# Patient Record
Sex: FEMALE | Race: WHITE | NOT HISPANIC OR LATINO
[De-identification: names, ages, dates, MRNs, and addresses within clinical notes are randomized per-mention and may not be internally consistent; named-entity substitution may affect disease eponyms.]

---

## 2019-11-07 ENCOUNTER — TRANSCRIPTION ENCOUNTER (OUTPATIENT)
Age: 79
End: 2019-11-07

## 2022-11-11 ENCOUNTER — RX ONLY (RX ONLY)
Age: 82
End: 2022-11-11

## 2022-11-11 ENCOUNTER — OFFICE (OUTPATIENT)
Dept: URBAN - METROPOLITAN AREA CLINIC 38 | Facility: CLINIC | Age: 82
Setting detail: OPHTHALMOLOGY
End: 2022-11-11
Payer: MEDICARE

## 2022-11-11 DIAGNOSIS — H04.563: ICD-10-CM

## 2022-11-11 DIAGNOSIS — H02.132: ICD-10-CM

## 2022-11-11 DIAGNOSIS — H02.135: ICD-10-CM

## 2022-11-11 PROBLEM — H16.223 DRY EYE SYNDROME K SICCA; BOTH EYES: Status: ACTIVE | Noted: 2022-11-11

## 2022-11-11 PROBLEM — H04.223 EPIPHORA- DUE TO INSUFFICIENT DRAINAGE; BOTH EYES: Status: ACTIVE | Noted: 2022-11-11

## 2022-11-11 PROCEDURE — 99204 OFFICE O/P NEW MOD 45 MIN: CPT | Performed by: OPHTHALMOLOGY

## 2022-11-11 PROCEDURE — 68840 EXPLORE/IRRIGATE TEAR DUCTS: CPT | Performed by: OPHTHALMOLOGY

## 2022-11-11 PROCEDURE — 92285 EXTERNAL OCULAR PHOTOGRAPHY: CPT | Performed by: OPHTHALMOLOGY

## 2022-11-11 ASSESSMENT — AXIALLENGTH_DERIVED
OS_AL: 23.4289
OD_AL: 23.4687

## 2022-11-11 ASSESSMENT — REFRACTION_AUTOREFRACTION
OD_AXIS: 055
OS_AXIS: 097
OS_SPHERE: -1.00
OD_CYLINDER: +0.25
OD_SPHERE: -0.75
OS_CYLINDER: +0.25

## 2022-11-11 ASSESSMENT — LID POSITION - ECTROPION
OS_ECTROPION: LLL T
OD_ECTROPION: RLL

## 2022-11-11 ASSESSMENT — CONFRONTATIONAL VISUAL FIELD TEST (CVF)
OD_FINDINGS: FULL
OS_FINDINGS: FULL

## 2022-11-11 ASSESSMENT — SUPERFICIAL PUNCTATE KERATITIS (SPK)
OD_SPK: 1+
OS_SPK: 1+

## 2022-11-11 ASSESSMENT — SPHEQUIV_DERIVED
OS_SPHEQUIV: -0.875
OD_SPHEQUIV: -0.625

## 2022-11-11 ASSESSMENT — KERATOMETRY
OD_AXISANGLE_DEGREES: 131
OD_K2POWER_DIOPTERS: 44.50
OS_K2POWER_DIOPTERS: 45.75
OS_K1POWER_DIOPTERS: 44.00
OS_AXISANGLE_DEGREES: 092
METHOD_AUTO_MANUAL: AUTO
OD_K1POWER_DIOPTERS: 44.50

## 2022-11-11 ASSESSMENT — VISUAL ACUITY
OS_BCVA: 20/25-
OD_BCVA: 20/25

## 2022-11-11 ASSESSMENT — PUNCTA - ASSESSMENT: OS_PUNCTA: SMALL

## 2022-11-12 PROBLEM — H04.563 PUNCTAL STENOSIS LACRIMAL PUNCTUM; BOTH EYES: Status: ACTIVE | Noted: 2022-11-11

## 2023-01-20 ENCOUNTER — OFFICE (OUTPATIENT)
Dept: URBAN - METROPOLITAN AREA CLINIC 38 | Facility: CLINIC | Age: 83
Setting detail: OPHTHALMOLOGY
End: 2023-01-20
Payer: MEDICARE

## 2023-01-20 DIAGNOSIS — H02.132: ICD-10-CM

## 2023-01-20 DIAGNOSIS — H02.135: ICD-10-CM

## 2023-01-20 DIAGNOSIS — H04.223: ICD-10-CM

## 2023-01-20 DIAGNOSIS — H16.223: ICD-10-CM

## 2023-01-20 DIAGNOSIS — H04.563: ICD-10-CM

## 2023-01-20 PROCEDURE — 99213 OFFICE O/P EST LOW 20 MIN: CPT | Performed by: OPHTHALMOLOGY

## 2023-01-20 ASSESSMENT — CONFRONTATIONAL VISUAL FIELD TEST (CVF)
OS_FINDINGS: FULL
OD_FINDINGS: FULL

## 2023-01-20 ASSESSMENT — LID POSITION - ECTROPION
OD_ECTROPION: RLL
OS_ECTROPION: LLL T

## 2023-01-20 ASSESSMENT — PUNCTA - ASSESSMENT: OS_PUNCTA: SMALL

## 2023-01-20 ASSESSMENT — SUPERFICIAL PUNCTATE KERATITIS (SPK)
OD_SPK: 1+
OS_SPK: 1+

## 2023-01-21 PROBLEM — H01.111 ALLERGIC DERMATITIS ; RIGHT UPPER LID, RIGHT LOWER LID, LEFT UPPER LID, LEFT LOWER LID: Status: ACTIVE | Noted: 2023-01-20

## 2023-01-21 PROBLEM — H01.115 ALLERGIC DERMATITIS ; RIGHT UPPER LID, RIGHT LOWER LID, LEFT UPPER LID, LEFT LOWER LID: Status: ACTIVE | Noted: 2023-01-20

## 2023-01-21 PROBLEM — H01.114 ALLERGIC DERMATITIS ; RIGHT UPPER LID, RIGHT LOWER LID, LEFT UPPER LID, LEFT LOWER LID: Status: ACTIVE | Noted: 2023-01-20

## 2023-01-21 PROBLEM — H01.112 ALLERGIC DERMATITIS ; RIGHT UPPER LID, RIGHT LOWER LID, LEFT UPPER LID, LEFT LOWER LID: Status: ACTIVE | Noted: 2023-01-20

## 2023-01-22 ASSESSMENT — KERATOMETRY
OD_AXISANGLE_DEGREES: 131
OS_AXISANGLE_DEGREES: 092
METHOD_AUTO_MANUAL: AUTO
OD_K2POWER_DIOPTERS: 44.50
OD_K1POWER_DIOPTERS: 44.50
OS_K1POWER_DIOPTERS: 44.00
OS_K2POWER_DIOPTERS: 45.75

## 2023-01-22 ASSESSMENT — SPHEQUIV_DERIVED
OS_SPHEQUIV: -0.875
OD_SPHEQUIV: -0.625

## 2023-01-22 ASSESSMENT — REFRACTION_AUTOREFRACTION
OS_AXIS: 097
OD_SPHERE: -0.75
OS_SPHERE: -1.00
OD_AXIS: 055
OS_CYLINDER: +0.25
OD_CYLINDER: +0.25

## 2023-01-22 ASSESSMENT — AXIALLENGTH_DERIVED
OD_AL: 23.4687
OS_AL: 23.4289

## 2023-01-22 ASSESSMENT — VISUAL ACUITY
OD_BCVA: 20/25
OS_BCVA: 20/25-

## 2023-02-03 ENCOUNTER — AMBULATORY SURGERY CENTER (OUTPATIENT)
Dept: URBAN - METROPOLITAN AREA SURGERY 4 | Facility: SURGERY | Age: 83
Setting detail: OPHTHALMOLOGY
End: 2023-02-03
Payer: MEDICARE

## 2023-02-03 DIAGNOSIS — H02.135: ICD-10-CM

## 2023-02-03 DIAGNOSIS — H04.563: ICD-10-CM

## 2023-02-03 DIAGNOSIS — H02.132: ICD-10-CM

## 2023-02-03 DIAGNOSIS — H04.223: ICD-10-CM

## 2023-02-03 PROCEDURE — 67914 REPAIR EYELID DEFECT: CPT | Performed by: OPHTHALMOLOGY

## 2023-02-03 PROCEDURE — 68440 SNIP INC LACRIMAL PUNCTUM: CPT | Performed by: OPHTHALMOLOGY

## 2023-02-03 PROCEDURE — 67950 REVISION OF EYELID: CPT | Performed by: OPHTHALMOLOGY

## 2023-02-03 PROCEDURE — 14060 TIS TRNFR E/N/E/L 10 SQ CM/<: CPT | Performed by: OPHTHALMOLOGY

## 2023-02-10 ENCOUNTER — OFFICE (OUTPATIENT)
Dept: URBAN - METROPOLITAN AREA CLINIC 38 | Facility: CLINIC | Age: 83
Setting detail: OPHTHALMOLOGY
End: 2023-02-10
Payer: MEDICARE

## 2023-02-10 ENCOUNTER — RX ONLY (RX ONLY)
Age: 83
End: 2023-02-10

## 2023-02-10 DIAGNOSIS — H02.132: ICD-10-CM

## 2023-02-10 DIAGNOSIS — H04.563: ICD-10-CM

## 2023-02-10 DIAGNOSIS — H02.135: ICD-10-CM

## 2023-02-10 PROCEDURE — 99024 POSTOP FOLLOW-UP VISIT: CPT | Performed by: OPHTHALMOLOGY

## 2023-02-10 ASSESSMENT — SUPERFICIAL PUNCTATE KERATITIS (SPK)
OD_SPK: 1+
OS_SPK: 1+

## 2023-02-10 ASSESSMENT — REFRACTION_AUTOREFRACTION
OS_AXIS: 097
OD_SPHERE: -0.75
OD_CYLINDER: +0.25
OS_SPHERE: -1.00
OS_CYLINDER: +0.25
OD_AXIS: 055

## 2023-02-10 ASSESSMENT — KERATOMETRY
OS_K1POWER_DIOPTERS: 44.00
METHOD_AUTO_MANUAL: AUTO
OD_AXISANGLE_DEGREES: 131
OD_K2POWER_DIOPTERS: 44.50
OS_K2POWER_DIOPTERS: 45.75
OD_K1POWER_DIOPTERS: 44.50
OS_AXISANGLE_DEGREES: 092

## 2023-02-10 ASSESSMENT — CONFRONTATIONAL VISUAL FIELD TEST (CVF)
OD_FINDINGS: FULL
OS_FINDINGS: FULL

## 2023-02-10 ASSESSMENT — LID POSITION - ECTROPION
OD_ECTROPION: ABSENT
OS_ECTROPION: ABSENT

## 2023-02-10 ASSESSMENT — SPHEQUIV_DERIVED
OD_SPHEQUIV: -0.625
OS_SPHEQUIV: -0.875

## 2023-02-10 ASSESSMENT — VISUAL ACUITY
OS_BCVA: 20/50
OD_BCVA: 20/40

## 2023-02-10 ASSESSMENT — PUNCTA - ASSESSMENT
OD_PUNCTA: LARGE
OS_PUNCTA: LARGE

## 2023-02-10 ASSESSMENT — LID POSITION - COMMENTS
OD_COMMENTS: WOUND C/D/I HEALING WELLGOOD HEIGHT AND GOOD SYMMETRY GOOD LID TENSION
OS_COMMENTS: WOUND C/D/I HEALING WELLGOOD HEIGHT AND GOOD SYMMETRY GOOD LID TENSION

## 2023-02-10 ASSESSMENT — AXIALLENGTH_DERIVED
OS_AL: 23.4289
OD_AL: 23.4687

## 2023-04-14 ENCOUNTER — RX ONLY (RX ONLY)
Age: 83
End: 2023-04-14

## 2023-04-14 ENCOUNTER — OFFICE (OUTPATIENT)
Dept: URBAN - METROPOLITAN AREA CLINIC 38 | Facility: CLINIC | Age: 83
Setting detail: OPHTHALMOLOGY
End: 2023-04-14
Payer: COMMERCIAL

## 2023-04-14 DIAGNOSIS — H02.135: ICD-10-CM

## 2023-04-14 DIAGNOSIS — H16.223: ICD-10-CM

## 2023-04-14 DIAGNOSIS — H02.132: ICD-10-CM

## 2023-04-14 DIAGNOSIS — H02.825: ICD-10-CM

## 2023-04-14 DIAGNOSIS — H04.563: ICD-10-CM

## 2023-04-14 PROCEDURE — 99024 POSTOP FOLLOW-UP VISIT: CPT | Performed by: OPHTHALMOLOGY

## 2023-04-14 PROCEDURE — 11440 EXC FACE-MM B9+MARG 0.5 CM/<: CPT | Performed by: OPHTHALMOLOGY

## 2023-04-14 ASSESSMENT — AXIALLENGTH_DERIVED
OS_AL: 23.4289
OD_AL: 23.4687

## 2023-04-14 ASSESSMENT — LID POSITION - ECTROPION
OS_ECTROPION: ABSENT
OD_ECTROPION: ABSENT

## 2023-04-14 ASSESSMENT — CONFRONTATIONAL VISUAL FIELD TEST (CVF)
OS_FINDINGS: FULL
OD_FINDINGS: FULL

## 2023-04-14 ASSESSMENT — KERATOMETRY
OD_AXISANGLE_DEGREES: 131
OD_K2POWER_DIOPTERS: 44.50
OS_AXISANGLE_DEGREES: 092
OS_K2POWER_DIOPTERS: 45.75
METHOD_AUTO_MANUAL: AUTO
OS_K1POWER_DIOPTERS: 44.00
OD_K1POWER_DIOPTERS: 44.50

## 2023-04-14 ASSESSMENT — PUNCTA - ASSESSMENT
OD_PUNCTA: LARGE
OS_PUNCTA: LARGE

## 2023-04-14 ASSESSMENT — VISUAL ACUITY
OD_BCVA: 20/25-1
OS_BCVA: 20/25-1

## 2023-04-14 ASSESSMENT — REFRACTION_AUTOREFRACTION
OD_AXIS: 055
OS_AXIS: 097
OS_CYLINDER: +0.25
OD_SPHERE: -0.75
OD_CYLINDER: +0.25
OS_SPHERE: -1.00

## 2023-04-14 ASSESSMENT — SPHEQUIV_DERIVED
OD_SPHEQUIV: -0.625
OS_SPHEQUIV: -0.875

## 2023-04-14 ASSESSMENT — SUPERFICIAL PUNCTATE KERATITIS (SPK)
OD_SPK: 1+
OS_SPK: 1+

## 2023-04-14 ASSESSMENT — LID POSITION - COMMENTS: OD_COMMENTS: GOOD HEIGHT AND GOOD SYMMETRY GOOD LID TENSION

## 2023-05-27 ENCOUNTER — INPATIENT (INPATIENT)
Facility: HOSPITAL | Age: 83
LOS: 2 days | Discharge: HOME | End: 2023-05-30
Attending: STUDENT IN AN ORGANIZED HEALTH CARE EDUCATION/TRAINING PROGRAM | Admitting: STUDENT IN AN ORGANIZED HEALTH CARE EDUCATION/TRAINING PROGRAM
Payer: MEDICARE

## 2023-05-27 VITALS
TEMPERATURE: 98 F | OXYGEN SATURATION: 97 % | DIASTOLIC BLOOD PRESSURE: 65 MMHG | HEART RATE: 82 BPM | SYSTOLIC BLOOD PRESSURE: 168 MMHG | RESPIRATION RATE: 16 BRPM

## 2023-05-27 DIAGNOSIS — K21.9 GASTRO-ESOPHAGEAL REFLUX DISEASE WITHOUT ESOPHAGITIS: ICD-10-CM

## 2023-05-27 DIAGNOSIS — K80.20 CALCULUS OF GALLBLADDER WITHOUT CHOLECYSTITIS WITHOUT OBSTRUCTION: ICD-10-CM

## 2023-05-27 DIAGNOSIS — Z98.890 OTHER SPECIFIED POSTPROCEDURAL STATES: Chronic | ICD-10-CM

## 2023-05-27 DIAGNOSIS — F41.9 ANXIETY DISORDER, UNSPECIFIED: ICD-10-CM

## 2023-05-27 DIAGNOSIS — Z96.652 PRESENCE OF LEFT ARTIFICIAL KNEE JOINT: Chronic | ICD-10-CM

## 2023-05-27 DIAGNOSIS — Z90.49 ACQUIRED ABSENCE OF OTHER SPECIFIED PARTS OF DIGESTIVE TRACT: Chronic | ICD-10-CM

## 2023-05-27 DIAGNOSIS — I10 ESSENTIAL (PRIMARY) HYPERTENSION: ICD-10-CM

## 2023-05-27 DIAGNOSIS — Z29.9 ENCOUNTER FOR PROPHYLACTIC MEASURES, UNSPECIFIED: ICD-10-CM

## 2023-05-27 DIAGNOSIS — E78.5 HYPERLIPIDEMIA, UNSPECIFIED: ICD-10-CM

## 2023-05-27 DIAGNOSIS — K80.50 CALCULUS OF BILE DUCT WITHOUT CHOLANGITIS OR CHOLECYSTITIS WITHOUT OBSTRUCTION: ICD-10-CM

## 2023-05-27 DIAGNOSIS — F10.90 ALCOHOL USE, UNSPECIFIED, UNCOMPLICATED: ICD-10-CM

## 2023-05-27 DIAGNOSIS — Z96.651 PRESENCE OF RIGHT ARTIFICIAL KNEE JOINT: Chronic | ICD-10-CM

## 2023-05-27 LAB
A1C WITH ESTIMATED AVERAGE GLUCOSE RESULT: 5.8 % — HIGH (ref 4–5.6)
ALBUMIN SERPL ELPH-MCNC: 3.6 G/DL — SIGNIFICANT CHANGE UP (ref 3.3–5)
ALP SERPL-CCNC: 427 U/L — HIGH (ref 40–120)
ALT FLD-CCNC: 185 U/L — HIGH (ref 4–33)
ANION GAP SERPL CALC-SCNC: 13 MMOL/L — SIGNIFICANT CHANGE UP (ref 7–14)
APTT BLD: 29.8 SEC — SIGNIFICANT CHANGE UP (ref 27–36.3)
AST SERPL-CCNC: 137 U/L — HIGH (ref 4–32)
BASE EXCESS BLDV CALC-SCNC: 3.1 MMOL/L — HIGH (ref -2–3)
BASOPHILS # BLD AUTO: 0 K/UL — SIGNIFICANT CHANGE UP (ref 0–0.2)
BASOPHILS NFR BLD AUTO: 0 % — SIGNIFICANT CHANGE UP (ref 0–2)
BILIRUB SERPL-MCNC: 5.2 MG/DL — HIGH (ref 0.2–1.2)
BLD GP AB SCN SERPL QL: NEGATIVE — SIGNIFICANT CHANGE UP
BLOOD GAS VENOUS COMPREHENSIVE RESULT: SIGNIFICANT CHANGE UP
BUN SERPL-MCNC: 17 MG/DL — SIGNIFICANT CHANGE UP (ref 7–23)
CALCIUM SERPL-MCNC: 8.1 MG/DL — LOW (ref 8.4–10.5)
CHLORIDE BLDV-SCNC: 98 MMOL/L — SIGNIFICANT CHANGE UP (ref 96–108)
CHLORIDE SERPL-SCNC: 95 MMOL/L — LOW (ref 98–107)
CHOLEST SERPL-MCNC: 120 MG/DL — SIGNIFICANT CHANGE UP
CO2 BLDV-SCNC: 29.9 MMOL/L — HIGH (ref 22–26)
CO2 SERPL-SCNC: 25 MMOL/L — SIGNIFICANT CHANGE UP (ref 22–31)
CREAT SERPL-MCNC: 0.68 MG/DL — SIGNIFICANT CHANGE UP (ref 0.5–1.3)
EGFR: 86 ML/MIN/1.73M2 — SIGNIFICANT CHANGE UP
EOSINOPHIL # BLD AUTO: 0 K/UL — SIGNIFICANT CHANGE UP (ref 0–0.5)
EOSINOPHIL NFR BLD AUTO: 0 % — SIGNIFICANT CHANGE UP (ref 0–6)
ESTIMATED AVERAGE GLUCOSE: 120 — SIGNIFICANT CHANGE UP
GAS PNL BLDV: 131 MMOL/L — LOW (ref 136–145)
GLUCOSE BLDV-MCNC: 115 MG/DL — HIGH (ref 70–99)
GLUCOSE SERPL-MCNC: 113 MG/DL — HIGH (ref 70–99)
HCO3 BLDV-SCNC: 28 MMOL/L — SIGNIFICANT CHANGE UP (ref 22–29)
HCT VFR BLD CALC: 34.2 % — LOW (ref 34.5–45)
HCT VFR BLDA CALC: 34 % — LOW (ref 34.5–46.5)
HDLC SERPL-MCNC: 34 MG/DL — LOW
HGB BLD CALC-MCNC: 11.3 G/DL — LOW (ref 11.7–16.1)
HGB BLD-MCNC: 11.1 G/DL — LOW (ref 11.5–15.5)
IANC: 17.16 K/UL — HIGH (ref 1.8–7.4)
INR BLD: 1.3 RATIO — HIGH (ref 0.88–1.16)
LACTATE BLDV-MCNC: 0.9 MMOL/L — SIGNIFICANT CHANGE UP (ref 0.5–2)
LIDOCAIN IGE QN: 25 U/L — SIGNIFICANT CHANGE UP (ref 7–60)
LIPID PNL WITH DIRECT LDL SERPL: 67 MG/DL — SIGNIFICANT CHANGE UP
LYMPHOCYTES # BLD AUTO: 0.97 K/UL — LOW (ref 1–3.3)
LYMPHOCYTES # BLD AUTO: 5.2 % — LOW (ref 13–44)
MAGNESIUM SERPL-MCNC: 2.1 MG/DL — SIGNIFICANT CHANGE UP (ref 1.6–2.6)
MANUAL SMEAR VERIFICATION: SIGNIFICANT CHANGE UP
MCHC RBC-ENTMCNC: 29.4 PG — SIGNIFICANT CHANGE UP (ref 27–34)
MCHC RBC-ENTMCNC: 32.5 GM/DL — SIGNIFICANT CHANGE UP (ref 32–36)
MCV RBC AUTO: 90.7 FL — SIGNIFICANT CHANGE UP (ref 80–100)
MONOCYTES # BLD AUTO: 0.65 K/UL — SIGNIFICANT CHANGE UP (ref 0–0.9)
MONOCYTES NFR BLD AUTO: 3.5 % — SIGNIFICANT CHANGE UP (ref 2–14)
NEUTROPHILS # BLD AUTO: 16.99 K/UL — HIGH (ref 1.8–7.4)
NEUTROPHILS NFR BLD AUTO: 82.6 % — HIGH (ref 43–77)
NEUTS BAND # BLD: 8.7 % — HIGH (ref 0–6)
NON HDL CHOLESTEROL: 86 MG/DL — SIGNIFICANT CHANGE UP
NT-PROBNP SERPL-SCNC: 2915 PG/ML — HIGH
PCO2 BLDV: 46 MMHG — SIGNIFICANT CHANGE UP (ref 39–52)
PH BLDV: 7.4 — SIGNIFICANT CHANGE UP (ref 7.32–7.43)
PHOSPHATE SERPL-MCNC: 3.4 MG/DL — SIGNIFICANT CHANGE UP (ref 2.5–4.5)
PLAT MORPH BLD: NORMAL — SIGNIFICANT CHANGE UP
PLATELET # BLD AUTO: 171 K/UL — SIGNIFICANT CHANGE UP (ref 150–400)
PLATELET COUNT - ESTIMATE: NORMAL — SIGNIFICANT CHANGE UP
PO2 BLDV: 73 MMHG — HIGH (ref 25–45)
POTASSIUM BLDV-SCNC: 3.7 MMOL/L — SIGNIFICANT CHANGE UP (ref 3.5–5.1)
POTASSIUM SERPL-MCNC: 3.7 MMOL/L — SIGNIFICANT CHANGE UP (ref 3.5–5.3)
POTASSIUM SERPL-SCNC: 3.7 MMOL/L — SIGNIFICANT CHANGE UP (ref 3.5–5.3)
PROT SERPL-MCNC: 6.4 G/DL — SIGNIFICANT CHANGE UP (ref 6–8.3)
PROTHROM AB SERPL-ACNC: 15.1 SEC — HIGH (ref 10.5–13.4)
RBC # BLD: 3.77 M/UL — LOW (ref 3.8–5.2)
RBC # FLD: 14.3 % — SIGNIFICANT CHANGE UP (ref 10.3–14.5)
RBC BLD AUTO: NORMAL — SIGNIFICANT CHANGE UP
RH IG SCN BLD-IMP: POSITIVE — SIGNIFICANT CHANGE UP
SAO2 % BLDV: 96.1 % — HIGH (ref 67–88)
SODIUM SERPL-SCNC: 133 MMOL/L — LOW (ref 135–145)
TRIGL SERPL-MCNC: 97 MG/DL — SIGNIFICANT CHANGE UP
TROPONIN T, HIGH SENSITIVITY RESULT: 15 NG/L — SIGNIFICANT CHANGE UP
TSH SERPL-MCNC: 2.07 UIU/ML — SIGNIFICANT CHANGE UP (ref 0.27–4.2)
WBC # BLD: 18.61 K/UL — HIGH (ref 3.8–10.5)
WBC # FLD AUTO: 18.61 K/UL — HIGH (ref 3.8–10.5)

## 2023-05-27 PROCEDURE — 43274 ERCP DUCT STENT PLACEMENT: CPT | Mod: GC

## 2023-05-27 PROCEDURE — 71045 X-RAY EXAM CHEST 1 VIEW: CPT | Mod: 26

## 2023-05-27 PROCEDURE — 93010 ELECTROCARDIOGRAM REPORT: CPT

## 2023-05-27 PROCEDURE — 74330 X-RAY BILE/PANC ENDOSCOPY: CPT | Mod: 26,GC

## 2023-05-27 PROCEDURE — 99223 1ST HOSP IP/OBS HIGH 75: CPT | Mod: 25

## 2023-05-27 PROCEDURE — 71275 CT ANGIOGRAPHY CHEST: CPT | Mod: 26

## 2023-05-27 PROCEDURE — 99223 1ST HOSP IP/OBS HIGH 75: CPT | Mod: GC

## 2023-05-27 PROCEDURE — 99285 EMERGENCY DEPT VISIT HI MDM: CPT

## 2023-05-27 DEVICE — GWIRE JAGTOME REVOLUTION RX 260CM/0.025IN: Type: IMPLANTABLE DEVICE | Status: FUNCTIONAL

## 2023-05-27 DEVICE — STENT BIL ADVANIX 10FRX7CM PRELOADED: Type: IMPLANTABLE DEVICE | Status: FUNCTIONAL

## 2023-05-27 RX ORDER — ENOXAPARIN SODIUM 100 MG/ML
40 INJECTION SUBCUTANEOUS EVERY 24 HOURS
Refills: 0 | Status: DISCONTINUED | OUTPATIENT
Start: 2023-05-27 | End: 2023-05-30

## 2023-05-27 RX ORDER — OXYCODONE HYDROCHLORIDE 5 MG/1
5 TABLET ORAL ONCE
Refills: 0 | Status: DISCONTINUED | OUTPATIENT
Start: 2023-05-27 | End: 2023-05-28

## 2023-05-27 RX ORDER — PANTOPRAZOLE SODIUM 20 MG/1
40 TABLET, DELAYED RELEASE ORAL
Refills: 0 | Status: DISCONTINUED | OUTPATIENT
Start: 2023-05-27 | End: 2023-05-30

## 2023-05-27 RX ORDER — THIAMINE MONONITRATE (VIT B1) 100 MG
100 TABLET ORAL DAILY
Refills: 0 | Status: DISCONTINUED | OUTPATIENT
Start: 2023-05-27 | End: 2023-05-30

## 2023-05-27 RX ORDER — ATORVASTATIN CALCIUM 80 MG/1
40 TABLET, FILM COATED ORAL AT BEDTIME
Refills: 0 | Status: DISCONTINUED | OUTPATIENT
Start: 2023-05-27 | End: 2023-05-30

## 2023-05-27 RX ORDER — PIPERACILLIN AND TAZOBACTAM 4; .5 G/20ML; G/20ML
3.38 INJECTION, POWDER, LYOPHILIZED, FOR SOLUTION INTRAVENOUS EVERY 8 HOURS
Refills: 0 | Status: DISCONTINUED | OUTPATIENT
Start: 2023-05-27 | End: 2023-05-30

## 2023-05-27 RX ORDER — INDOMETHACIN 50 MG
100 CAPSULE ORAL ONCE
Refills: 0 | Status: COMPLETED | OUTPATIENT
Start: 2023-05-27 | End: 2023-05-27

## 2023-05-27 RX ORDER — FOLIC ACID 0.8 MG
1 TABLET ORAL DAILY
Refills: 0 | Status: DISCONTINUED | OUTPATIENT
Start: 2023-05-27 | End: 2023-05-30

## 2023-05-27 RX ORDER — HYDROMORPHONE HYDROCHLORIDE 2 MG/ML
0.5 INJECTION INTRAMUSCULAR; INTRAVENOUS; SUBCUTANEOUS
Refills: 0 | Status: DISCONTINUED | OUTPATIENT
Start: 2023-05-27 | End: 2023-05-28

## 2023-05-27 RX ORDER — ESCITALOPRAM OXALATE 10 MG/1
20 TABLET, FILM COATED ORAL DAILY
Refills: 0 | Status: DISCONTINUED | OUTPATIENT
Start: 2023-05-27 | End: 2023-05-30

## 2023-05-27 RX ORDER — SODIUM CHLORIDE 9 MG/ML
1000 INJECTION, SOLUTION INTRAVENOUS
Refills: 0 | Status: DISCONTINUED | OUTPATIENT
Start: 2023-05-27 | End: 2023-05-27

## 2023-05-27 RX ORDER — KETOROLAC TROMETHAMINE 30 MG/ML
15 SYRINGE (ML) INJECTION EVERY 6 HOURS
Refills: 0 | Status: DISCONTINUED | OUTPATIENT
Start: 2023-05-27 | End: 2023-05-27

## 2023-05-27 RX ORDER — SODIUM CHLORIDE 9 MG/ML
1000 INJECTION, SOLUTION INTRAVENOUS
Refills: 0 | Status: DISCONTINUED | OUTPATIENT
Start: 2023-05-27 | End: 2023-05-28

## 2023-05-27 RX ORDER — KETOROLAC TROMETHAMINE 30 MG/ML
15 SYRINGE (ML) INJECTION ONCE
Refills: 0 | Status: DISCONTINUED | OUTPATIENT
Start: 2023-05-27 | End: 2023-05-27

## 2023-05-27 RX ORDER — ACETAMINOPHEN 500 MG
650 TABLET ORAL EVERY 6 HOURS
Refills: 0 | Status: DISCONTINUED | OUTPATIENT
Start: 2023-05-27 | End: 2023-05-30

## 2023-05-27 RX ORDER — ONDANSETRON 8 MG/1
4 TABLET, FILM COATED ORAL ONCE
Refills: 0 | Status: DISCONTINUED | OUTPATIENT
Start: 2023-05-27 | End: 2023-05-28

## 2023-05-27 RX ORDER — PIPERACILLIN AND TAZOBACTAM 4; .5 G/20ML; G/20ML
3.38 INJECTION, POWDER, LYOPHILIZED, FOR SOLUTION INTRAVENOUS ONCE
Refills: 0 | Status: COMPLETED | OUTPATIENT
Start: 2023-05-27 | End: 2023-05-27

## 2023-05-27 RX ADMIN — Medication 15 MILLIGRAM(S): at 05:05

## 2023-05-27 RX ADMIN — ATORVASTATIN CALCIUM 40 MILLIGRAM(S): 80 TABLET, FILM COATED ORAL at 22:40

## 2023-05-27 RX ADMIN — PIPERACILLIN AND TAZOBACTAM 200 GRAM(S): 4; .5 INJECTION, POWDER, LYOPHILIZED, FOR SOLUTION INTRAVENOUS at 06:43

## 2023-05-27 RX ADMIN — Medication 100 MILLIGRAM(S): at 18:00

## 2023-05-27 RX ADMIN — PIPERACILLIN AND TAZOBACTAM 25 GRAM(S): 4; .5 INJECTION, POWDER, LYOPHILIZED, FOR SOLUTION INTRAVENOUS at 22:40

## 2023-05-27 RX ADMIN — PIPERACILLIN AND TAZOBACTAM 25 GRAM(S): 4; .5 INJECTION, POWDER, LYOPHILIZED, FOR SOLUTION INTRAVENOUS at 09:48

## 2023-05-27 RX ADMIN — SODIUM CHLORIDE 75 MILLILITER(S): 9 INJECTION, SOLUTION INTRAVENOUS at 18:58

## 2023-05-27 RX ADMIN — SODIUM CHLORIDE 100 MILLILITER(S): 9 INJECTION, SOLUTION INTRAVENOUS at 07:24

## 2023-05-27 RX ADMIN — Medication 100 MILLIGRAM(S): at 17:45

## 2023-05-27 RX ADMIN — Medication 15 MILLIGRAM(S): at 05:35

## 2023-05-27 NOTE — CONSULT NOTE ADULT - ASSESSMENT
Tok[place] criteria grade 1 (mild cholangitis)    Recommendations:    - continue abx   - supportive management   - CIWA for alcohol withdrawal   -   -  Impression:     #choledocholithiasis vs soft tissue mass   #hyperbilirubinemia  #elevated transaminases   -elevated Tbili likely related to CT findings. Elevated transaminases also indicative of cholestatic pattern   -Tok[place] criteria grade 1 (mild cholangitis)     #alcohol use disorder- no hx of withdrawal/seizures     Recommendations:    - continue abx   - supportive management   - CIWA for alcohol withdrawal   - trend CMP, CBC daily   - MRCP for better characterization of choledocholithiasis vs intraductal mass   - wean O2 as tolerated   - rest of care per primary team     All recommendations are tentative until note is attested by attending.     Cathy Aceves, PGY-4   Gastroenterology/Hepatology Fellow  Available on Microsoft Teams  [phone] ([facility] Short Range Pager)  [phone] ([facility] Long Range Pager)    After 5pm, please contact the on-call GI fellow. [phone] Impression:     #choledocholithiasis vs soft tissue mass   #hyperbilirubinemia  #elevated transaminases   -elevated Tbili likely related to CT findings. Elevated transaminases also indicative of cholestatic pattern   -Tok[place] criteria grade 2 (mild cholangitis)     #alcohol use disorder- no hx of withdrawal/seizures     Recommendations:    - continue abx   - supportive management   - CIWA for alcohol withdrawal   - trend CMP, CBC daily   - MRCP for better characterization of choledocholithiasis vs intraductal mass   - wean O2 as tolerated   - rest of care per primary team     All recommendations are tentative until note is attested by attending.     Cathy Aceves, PGY-4   Gastroenterology/Hepatology Fellow  Available on Microsoft Teams  [phone] ([facility] Short Range Pager)  [phone] ([facility] Long Range Pager)    After 5pm, please contact the on-call GI fellow. [phone] Impression:     #choledocholithiasis vs soft tissue mass per CT read   #hyperbilirubinemia  #elevated transaminases   -elevated Tbili likely related to CT findings. Elevated transaminases also indicative of cholestatic pattern   -[place] criteria grade 2 (cholangitis)     #alcohol use disorder- no hx of withdrawal/seizures     Recommendations:    - continue abx   - supportive management   - CIWA for alcohol withdrawal   - trend CMP, CBC daily   - wean O2 as tolerated   - rest of care per primary team     All recommendations are tentative until note is attested by attending.     Cathy Aceves, PGY-4   Gastroenterology/Hepatology Fellow  Available on Microsoft Teams  [phone] ([facility] Short Range Pager)  [phone] ([facility] Long Range Pager)    After 5pm, please contact the on-call GI fellow. [phone] Impression:     #choledocholithiasis vs soft tissue mass per CT read   #hyperbilirubinemia  #elevated transaminases   -elevated Tbili likely related to CT findings. Elevated transaminases also indicative of cholestatic pattern   -[place] criteria grade 2 (cholangitis)     #alcohol use disorder- no hx of withdrawal/seizures     Recommendations:    - continue abx   - supportive management   - keep NPO for tentative ERCP today   - CIWA for alcohol withdrawal   - trend CMP, CBC daily   - wean O2 as tolerated   - rest of care per primary team     All recommendations are tentative until note is attested by attending.     Cathy Aceves, PGY-4   Gastroenterology/Hepatology Fellow  Available on Microsoft Teams  [phone] ([facility] Short Range Pager)  [phone] ([facility] Long Range Pager)    After 5pm, please contact the on-call GI fellow. [phone]

## 2023-05-27 NOTE — ED PROVIDER NOTE - PHYSICAL EXAMINATION
General: non-toxic, NAD  HEENT: NCAT, PERRL, scleral icterus  Cardiac: RRR, no murmurs, 2+ peripheral pulses  Resp: CTAB  Abdomen: soft, non-distended, bowel sounds present, no ttp, no rebound or guarding. no organomegaly  Extremities: +peripheral edema, no calf tenderness, or leg size discrepancies  Skin: no rashes or jaundice  Neuro: AAOx4, 5+motor, sensation grossly intact  Psych: mood and affect appropriate

## 2023-05-27 NOTE — H&P ADULT - PROBLEM SELECTOR PLAN 2
Pt w/ hx of almost daily alcohol use (bottle of wine). Last drink on Thursday night (5/18).    -sx triggered CIWA  -c/w home thiamine and folic acid Pt w/ hx of almost daily alcohol use (bottle of wine). Last drink on Thursday night (5/18). Still within window of 24-72 hours. No prior hx of withdrawal (including seizures).    -sx triggered CIWA  -c/w home thiamine and folic acid  - fall & seizure precautions    - zofran 4mg IV Q8H PRN nausea and/or vomiting  - monitor electrolytes & replete PRN  -  consult

## 2023-05-27 NOTE — CONSULT NOTE ADULT - SUBJECTIVE AND OBJECTIVE BOX
HPI: 82y female with hx HTN, etoh use (one bottle of wine daily, no hx withdrawal seizures) presents from [facility] for concerns of choledocholithiasis.     Patient accompanied by grandson at bedside. Per grandson, patient is at baseline mental status, but appears a little disoriented at times given that they have not slept all night and have been transferred from another hospital.     Patient drinks a large bottle of alcohol a day. Has not had withdrawal symptoms previously/szs/DTs previously. Has been at detox 3-4 years ago.     Allergies:  No Known Allergies      Home Medications:    Hospital Medications:  lactated ringers. 1000 milliLiter(s) IV Continuous <Continuous>  piperacillin/tazobactam IVPB.. 3.375 Gram(s) IV Intermittent every 8 hours      PMHX/PSHX:      Family history:      Denies family history of colon cancer/polyps, stomach cancer/polyps, pancreatic cancer/masses, liver cancer/disease, ovarian cancer and endometrial cancer.    Social History:   Tob: Denies  EtOH: Denies  Illicit Drugs: Denies    ROS:     General:  No wt loss, fevers, chills, night sweats, fatigue  Eyes:  Good vision, no reported pain  ENT:  No sore throat, pain, runny nose, dysphagia  CV:  No pain, palpitations, hypo/hypertension  Pulm:  No dyspnea, cough, tachypnea, wheezing  GI:  see HPI  :  No pain, bleeding, incontinence, nocturia  Muscle:  No pain, weakness  Neuro:  No weakness, tingling, memory problems  Psych:  No fatigue, insomnia, mood problems, depression  Endocrine:  No polyuria, polydipsia, cold/heat intolerance  Heme:  No petechiae, ecchymosis, easy bruisability  Skin:  No rash, tattoos, scars, edema    PHYSICAL EXAM:     GENERAL:  No acute distress  HEENT:  NCAT, no scleral icterus   CHEST:  no respiratory distress  HEART:  Regular rate and rhythm  ABDOMEN:  Soft, non-tender, non-distended, normoactive bowel sounds,  no masses  EXTREMITIES: No edema  SKIN:  No rash/erythema/ecchymoses/petechiae/wounds/abscess/warm/dry  NEURO:  Alert and oriented x 3, no asterixis    Vital Signs:  Vital Signs Last 24 Hrs  T(C): 36.7 (27 May 2023 06:30), Max: 36.8 (27 May 2023 03:30)  T(F): 98 (27 May 2023 06:30), Max: 98.2 (27 May 2023 03:30)  HR: 77 (27 May 2023 06:30) (77 - 82)  BP: 104/57 (27 May 2023 06:30) (104/57 - 168/65)  BP(mean): --  RR: 16 (27 May 2023 06:30) (16 - 16)  SpO2: 97% (27 May 2023 06:30) (97% - 97%)    Parameters below as of 27 May 2023 06:30  Patient On (Oxygen Delivery Method): nasal cannula  O2 Flow (L/min): 2    Daily     Daily     LABS:                        11.1   18.61 )-----------( 171      ( 27 May 2023 05:05 )             34.2     Mean Cell Volume: 90.7 fL (05-27-23 @ 05:05)    05-27    133<L>  |  95<L>  |  17  ----------------------------<  113<H>  3.7   |  25  |  0.68    Ca    8.1<L>      27 May 2023 05:05    TPro  6.4  /  Alb  3.6  /  TBili  5.2<H>  /  DBili  x   /  AST  137<H>  /  ALT  185<H>  /  AlkPhos  427<H>  05-27    LIVER FUNCTIONS - ( 27 May 2023 05:05 )  Alb: 3.6 g/dL / Pro: 6.4 g/dL / ALK PHOS: 427 U/L / ALT: 185 U/L / AST: 137 U/L / GGT: x               Amylase Serum--      Lipase serum25       Ammonia--                          11.1   18.61 )-----------( 171      ( 27 May 2023 05:05 )             34.2       Imaging:             HPI: 82y female with hx HTN, etoh use (one bottle of wine daily, no hx withdrawal seizures) presents from [facility] for concerns of choledocholithiasis.     Patient accompanied by grandson at bedside. Per grandson, patient is at baseline mental status, but appears a little disoriented at times given that they have not slept all night and have been transferred from another hospital. Patient had abdominal pain 1 week prior and had gone to the hospital for evaluation. At [facility], her transaminases had been improving so she was discharged with followup with GI for ?EUS. Patient reports that she was doing well but had recurrent abd pain in Thursday with emesis (nonbloody). Per grandson, patient had been jaundiced one day prior to admission. Abdominal pain more frequently after meals.     CT with CBD dilated to 1.5cm and abrupt narrowing of CBD distally. Soft tissue density. No pancreatic duct dilation. US with no CBD dilation.     Patient reports mild abd pain with palpation only (bilateral upper quadrants). Has not had an endoscopy previously. Patient denies fevers, chills, dysuria.     Patient drinks a large bottle of alcohol a day. Has not had withdrawal symptoms previously/szs/DTs previously. Has been at detox 3-4 years ago.     Allergies:  No Known Allergies      Home Medications:    Hospital Medications:  lactated ringers. 1000 milliLiter(s) IV Continuous <Continuous>  piperacillin/tazobactam IVPB.. 3.375 Gram(s) IV Intermittent every 8 hours      PMHX/PSHX:      Family history:      Denies family history of colon cancer/polyps, stomach cancer/polyps, pancreatic cancer/masses, liver cancer/disease, ovarian cancer and endometrial cancer.    Social History:   Tob: Denies  EtOH: Denies  Illicit Drugs: Denies    ROS:     General:  No wt loss, fevers, chills, night sweats, fatigue  Eyes:  Good vision, no reported pain  ENT:  No sore throat, pain, runny nose, dysphagia  CV:  No pain, palpitations, hypo/hypertension  Pulm:  No dyspnea, cough, tachypnea, wheezing  GI:  see HPI  :  No pain, bleeding, incontinence, nocturia  Muscle:  No pain, weakness  Neuro:  No weakness, tingling, memory problems  Psych:  No fatigue, insomnia, mood problems, depression  Endocrine:  No polyuria, polydipsia, cold/heat intolerance  Heme:  No petechiae, ecchymosis, easy bruisability  Skin:  No rash, tattoos, scars, edema    PHYSICAL EXAM:     GENERAL:  No acute distress, patient is very pleasant   HEENT:  NCAT, +scleral icterus   CHEST:  no respiratory distress, although patient on 2L (not home O2)   HEART:  Regular rate and rhythm  ABDOMEN:  Soft, non-tender, non-distended, no masses  EXTREMITIES: No edema  SKIN:  No rash/erythema/ecchymoses/petechiae/wounds/abscess/warm/dry  NEURO:  Alert and oriented x 3    Vital Signs:  Vital Signs Last 24 Hrs  T(C): 36.7 (27 May 2023 06:30), Max: 36.8 (27 May 2023 03:30)  T(F): 98 (27 May 2023 06:30), Max: 98.2 (27 May 2023 03:30)  HR: 77 (27 May 2023 06:30) (77 - 82)  BP: 104/57 (27 May 2023 06:30) (104/57 - 168/65)  BP(mean): --  RR: 16 (27 May 2023 06:30) (16 - 16)  SpO2: 97% (27 May 2023 06:30) (97% - 97%)    Parameters below as of 27 May 2023 06:30  Patient On (Oxygen Delivery Method): nasal cannula  O2 Flow (L/min): 2    Daily     Daily     LABS:                        11.1   18.61 )-----------( 171      ( 27 May 2023 05:05 )             34.2     Mean Cell Volume: 90.7 fL (05-27-23 @ 05:05)    05-27    133<L>  |  95<L>  |  17  ----------------------------<  113<H>  3.7   |  25  |  0.68    Ca    8.1<L>      27 May 2023 05:05    TPro  6.4  /  Alb  3.6  /  TBili  5.2<H>  /  DBili  x   /  AST  137<H>  /  ALT  185<H>  /  AlkPhos  427<H>  05-27    LIVER FUNCTIONS - ( 27 May 2023 05:05 )  Alb: 3.6 g/dL / Pro: 6.4 g/dL / ALK PHOS: 427 U/L / ALT: 185 U/L / AST: 137 U/L / GGT: x               Amylase Serum--      Lipase serum25       Ammonia--                          11.1   18.61 )-----------( 171      ( 27 May 2023 05:05 )             34.2       Imaging:

## 2023-05-27 NOTE — ED PROVIDER NOTE - ATTENDING CONTRIBUTION TO CARE
DR. SOMMER, ATTENDING MD-  I performed a face to face bedside interview with the patient regarding history of present illness, review of symptoms and past medical history. I completed an independent physical exam.  I have discussed the patient's plan of care with the resident.   Documentation as above in the note.    83 y/o female xfer from osh for gi eval of cholangitis.  Obtain rpt labs, admit for further gi care and evaluation.

## 2023-05-27 NOTE — H&P ADULT - NSHPREVIEWOFSYSTEMS_GEN_ALL_CORE
REVIEW OF SYSTEMS:    CONSTITUTIONAL: No weakness, fevers or chills  EYES/ENT: No visual changes;  No vertigo or throat pain   NECK: No pain or stiffness  RESPIRATORY: No cough, wheezing, hemoptysis; No shortness of breath  CARDIOVASCULAR: No chest pain or palpitations  GASTROINTESTINAL: +abdominal pain. No epigastric pain. No nausea, vomiting, or hematemesis; No diarrhea or constipation. No melena or hematochezia.  GENITOURINARY: No dysuria, frequency or hematuria  NEUROLOGICAL: No numbness or weakness  SKIN: No itching, rashes

## 2023-05-27 NOTE — H&P ADULT - ASSESSMENT
82y female with PMHx of HTN, etoh use (one bottle of wine daily, no hx withdrawal seizures) presenting from [facility] for concerns of choledocholithiasis, found to have elevated transaminases, hyperbilirubinemia and choledocholithiasis vs soft tissue mass per CT read.

## 2023-05-27 NOTE — ED ADULT NURSE NOTE - OBJECTIVE STATEMENT
A&OX4. PMH: Hx HTN and HLD.  Patient arrived from [facility] for GI consult.  Patient seen at at [facility] for c/o Abd pain with N/V for 1 week.  Diagnosed with cholangitis.  20g LAC by [facility].  Zosyn at 20:30, Magnesium 2 grams at 21:30 for low Magnesium level = 1.5.  Morphine 4mg at 21:11.  Zofran 4mg, NS, Tylenol 650mg. Respirations even and unlabored on 2L NC. Normal sinus on monitor. Labs obtained, awaiting results. Medications given as per current care plan. Safety precautions in place, bed in  lowest position and oriented to call bell.

## 2023-05-27 NOTE — H&P ADULT - PROBLEM SELECTOR PLAN 1
Pt presenting w/ hyperbilirubinemia, elevated transaminases. CT findings w/ soft tissue mass.    - appreciate GI recs  - continue zosyn  - keep NPO for tentative ERCP today   - trend CMP, CBC daily   - wean O2 as tolerated Pt presenting w/ hyperbilirubinemia, elevated transaminases. CT findings w/ soft tissue mass.    - appreciate GI recs  - continue zosyn  - keep NPO for tentative ERCP today   - if ERCP does not happen today, will keep on CLD  - trend CMP, CBC daily   - wean O2 as tolerated Pt presenting w/ hyperbilirubinemia, elevated transaminases. CT findings w/ soft tissue mass.    - appreciate GI recs  - continue zosyn  - blood cx obtained  - keep NPO for tentative ERCP today   - if ERCP does not happen today, will keep on CLD  - trend CMP, CBC daily   - wean O2 as tolerated Pt presenting w/ hyperbilirubinemia, elevated transaminases. CT with CBD dilated to 1.5cm and abrupt narrowing of CBD distally. Soft tissue density. No pancreatic duct dilation. US with no CBD dilation.     - appreciate GI recs  - continue zosyn  - blood cx obtained  - keep NPO for tentative ERCP today   - if ERCP does not happen today, will keep on CLD  - trend CMP, CBC daily   - wean O2 as tolerated

## 2023-05-27 NOTE — CONSULT NOTE ADULT - ATTENDING COMMENTS
83 yo F pmh obesity, active etoh use (1 bottle wine per day) presenting with 1 week of abd pain and new jaundice.  Went to OSH and found to have elevated LAEs a few days ago, but d/c home with outpatient follow up.  As SI developed and pain persisted, came back to ED at OSH.  Found to have elevated bili 5+, alk p elevated, and ast/alt improving but still in 300-400s. U/s with dilated duct and cholelithiasis. CT showing intraductal soft tissue lesion of unknown etiology.  WBC to 18s but no fever.  Tok[place] criteria 2 for cholangitis (moderate) like from obstructive source - ddx including stone vs mass (cholangio, ampullary).  On abx, tender on abd exam but no peritonitis or rigidity.  Ultimately will likely need EUS/ERCP in next 24-48 hours - will coordinate with hepatobiliary team (Radha ADAMS on call this weekend).  Keep NPO, keep abx at this time.  Reviewed with patient and grandson who are amenable.  Lastly would monitor for signs of withdrawal given etoh history.  While etoh hep/liver pathology were considered - much less likely cause of elevated LAEs given radiology and lae pattern

## 2023-05-27 NOTE — H&P ADULT - NSICDXPASTSURGICALHX_GEN_ALL_CORE_FT
PAST SURGICAL HISTORY:  History of right knee joint replacement     Knee joint replacement status, left     S/P appendectomy     S/P bunionectomy

## 2023-05-27 NOTE — ED ADULT NURSE NOTE - CHIEF COMPLAINT QUOTE
Pt arrives from [facility] for GI consult.  Pt seen at c/o Abd pain with N/V for 1 week.  Dx with cholangitis.  Hx HTN, HLD.  Pt had recent admission at [facility] and d/c home.  20g LAC by [facility].  Zosyn at 20:30, Magnesium 2 grams at 21:30 for low Magnesium level = 1.5.  Morphine 4mg at 21:11.  Zofran 4mg, NS, Tylenol 650mg.

## 2023-05-27 NOTE — ED PROVIDER NOTE - OBJECTIVE STATEMENT
82y female with hx HTN, etoh use (one bottle of wine daily, no hx withdrawal seizures) presents from [facility] for concerns of cholangitis. was admitted for 1w with elevated LFTs, which downtrended during admission and pt was discharged with plan for follow up EUS. thursday evening had an event of severe abd pain and vomiting nbnb so went to PCP friday AM who sent her to ED. at OSH CT and US showed cholelithiasis and CBD 7mm (appropriate for age) with elevated LFTs and alk phos to 200s/150s. lipase wnl. afebrile with hyperbili to 5 and scleral icterus. hypoxic to 88 in triage there with no hx tobacco smoke or lung disease, however states she has been dyspneic on exertion for some time. transferred for ERCP

## 2023-05-27 NOTE — ASU PREOP CHECKLIST - 1.
WALLACE Drinks 1 bottle of wine daily WALLACE Drinks 1 bottle of wine daily. No tremors, agitation or seizures noted

## 2023-05-27 NOTE — ED PROVIDER NOTE - PROGRESS NOTE DETAILS
GUADALUPE Mackey PGY2 discussed with GI. will see pt. GUADALUPE Mackey PGY2 discussed with GI. will see pt. labs somewhat improved from [facility]. hospitalist radha. GUADALUPE Mackey PGY2 discussed with hospitalist. accepts pt, requesting CTA for investigation of new hypoxia as well as pre-ops. will order trop/bnp for staging if PE+. pt clinically stable.

## 2023-05-27 NOTE — ED PROVIDER NOTE - CLINICAL SUMMARY MEDICAL DECISION MAKING FREE TEXT BOX
elderly female fatigued appearing presents as a transfer for gI in the setting of cholangitis. also with hx concerning for heart failure with hypoxia in triage at [place] and on interview here. pt on 0.5L NC. XR and repeat labs. cardiac monitor. VS stable. tba for cardiac workup/ercp.

## 2023-05-27 NOTE — H&P ADULT - NSHPPHYSICALEXAM_GEN_ALL_CORE
T(C): 36.8 (05-27-23 @ 09:08), Max: 36.8 (05-27-23 @ 03:30)  HR: 83 (05-27-23 @ 09:08) (77 - 83)  BP: 120/61 (05-27-23 @ 09:08) (104/57 - 168/65)  RR: 19 (05-27-23 @ 09:08) (16 - 19)  SpO2: 97% (05-27-23 @ 09:08) (97% - 97%)    PHYSICAL EXAM:  GENERAL: NAD, well-groomed, well-developed  HEAD:  Atraumatic, Normocephalic  EYES: EOMI, PERRLA, conjunctiva and sclera clear, no jaundice   ENMT: No tonsillar erythema, exudates, or enlargement; Moist mucous membranes  NECK: Supple, non tender, No JVD, Normal thyroid  HEART: Regular rate and rhythm; No murmurs, rubs, or gallops. S1/S2 present  RESPIRATORY: CTA B/L, No W/R/R  ABDOMEN: Soft, Nontender, Nondistended; Bowel sounds present. No hepatosplenomegally  NEUROLOGY: A&Ox3, nonfocal, moving all extremities,  EXTREMITIES:  2+ Peripheral Pulses, No clubbing, cyanosis, or edema. 5/5 muscle tone in UE/LE  SKIN: warm, dry, normal color, no rash or abnormal lesions T(C): 36.8 (05-27-23 @ 09:08), Max: 36.8 (05-27-23 @ 03:30)  HR: 83 (05-27-23 @ 09:08) (77 - 83)  BP: 120/61 (05-27-23 @ 09:08) (104/57 - 168/65)  RR: 19 (05-27-23 @ 09:08) (16 - 19)  SpO2: 97% (05-27-23 @ 09:08) (97% - 97%)    PHYSICAL EXAM:  GENERAL: NAD, well-groomed, obese female  HEAD:  Atraumatic, Normocephalic  EYES: EOMI, PERRLA, conjunctiva and sclera clear, +scleral icterus   ENMT: No tonsillar erythema, exudates, or enlargement; Moist mucous membranes  NECK: Supple, non tender, No JVD, Normal thyroid  HEART: Regular rate and rhythm; No murmurs, rubs, or gallops. S1/S2 present  RESPIRATORY: Decreased breath sounds B/L, No W/R/R  ABDOMEN: Soft, TTP of upper quadrants, Nondistended; Bowel sounds present. No hepatosplenomegaly  NEUROLOGY: A&Ox3, nonfocal, moving all extremities  EXTREMITIES:  2+ Peripheral Pulses, No clubbing, cyanosis, or edema  SKIN: warm, dry, normal color, no rash or abnormal lesions

## 2023-05-27 NOTE — H&P ADULT - ATTENDING COMMENTS
81 yo F PMH of HTN, HLD, obesity, etoh use presenting with 1 week of abd pain and jaundice. Patient was admitted to OSH 1 week ago, but due to improving LFTs, was discharged. She returned to the ED after abdominal pain progressively worsened. She was found to have a neutrophil predominant leukocytosis to 18, bili 5.2, alk phos 400+ and AST/ALT in the 100s. GI was consulted given abdominal imaging done at OSH (dilated duct and cholelithiasis on ultrasound). GI planning for ERCP today as there is c/f choledocholithiasis.  Will continue zosyn given c/f cholangitis.     Pt also underwent CTA in the ED, without sig findings of PE or pulmonary pathology. Just some mild bibasilar subsegmental atelectasis for which we can given incentive spirometry post ERCP.     Will monitor pt on symptom triggered CIWA given daily alcohol use (1 bottle of wine). No hx of withdrawal seizures. Continue thiamine and folic acid    Monitor hyponatremia. Check urine ltyes/osmol in the AM if worsens.     Can restart home BP meds after procedure if BP continues to be sig elevated. Start with losartan.     Discussed with HS

## 2023-05-27 NOTE — H&P ADULT - NSHPLABSRESULTS_GEN_ALL_CORE
LABS: Personally reviewed labs, imaging, and ECG                          11.1   18.61 )-----------( 171      ( 27 May 2023 05:05 )             34.2       05-27    133<L>  |  95<L>  |  17  ----------------------------<  113<H>  3.7   |  25  |  0.68    Ca    8.1<L>      27 May 2023 05:05  Phos  3.4     05-27  Mg     2.10     05-27    TPro  6.4  /  Alb  3.6  /  TBili  5.2<H>  /  DBili  x   /  AST  137<H>  /  ALT  185<H>  /  AlkPhos  427<H>  05-27       LIVER FUNCTIONS - ( 27 May 2023 05:05 )  Alb: 3.6 g/dL / Pro: 6.4 g/dL / ALK PHOS: 427 U/L / ALT: 185 U/L / AST: 137 U/L / GGT: x                        PT/INR - ( 27 May 2023 07:31 )   PT: 15.1 sec;   INR: 1.30 ratio         PTT - ( 27 May 2023 07:31 )  PTT:29.8 sec    Lactate Trend            CAPILLARY BLOOD GLUCOSE                RADIOLOGY & ADDITIONAL TESTS: LABS: Personally reviewed labs, imaging, and ECG                          11.1   18.61 )-----------( 171      ( 27 May 2023 05:05 )             34.2       05-27    133<L>  |  95<L>  |  17  ----------------------------<  113<H>  3.7   |  25  |  0.68    Ca    8.1<L>      27 May 2023 05:05  Phos  3.4     05-27  Mg     2.10     05-27    TPro  6.4  /  Alb  3.6  /  TBili  5.2<H>  /  DBili  x   /  AST  137<H>  /  ALT  185<H>  /  AlkPhos  427<H>  05-27       LIVER FUNCTIONS - ( 27 May 2023 05:05 )  Alb: 3.6 g/dL / Pro: 6.4 g/dL / ALK PHOS: 427 U/L / ALT: 185 U/L / AST: 137 U/L / GGT: x                        PT/INR - ( 27 May 2023 07:31 )   PT: 15.1 sec;   INR: 1.30 ratio         PTT - ( 27 May 2023 07:31 )  PTT:29.8 sec    Lactate Trend      CAPILLARY BLOOD GLUCOSE        RADIOLOGY & ADDITIONAL TESTS:    < from: CT Angio Chest PE Protocol w/ IV Cont (05.27.23 @ 08:46) >    ACC: [id number] EXAM:  CT ANGIO CHEST PULM Formerly [facility], [facility]   ORDERED BY: TAJ DUMONT     PROCEDURE DATE:  05/27/2023          INTERPRETATION:  CLINICAL INFORMATION: New hypoxia and dyspnea on   exertion.    COMPARISON: Chest x-ray performed earlier onthe same day.    CONTRAST/COMPLICATIONS:  IV Contrast: Omnipaque 350  75 cc administered   25 cc discarded  Oral Contrast: NONE  Complications: None reported at time of study completion    PROCEDURE:  CT Angiography of the Chest.  Sagittal and coronal reformats were performed as well as 3D (MIP)   reconstructions.    FINDINGS:    LUNGS AND AIRWAYS: Patent central airways.  Mild bibasilar subsegmental   atelectasis.  PLEURA: No pleural effusion.  MEDIASTINUM AND NICANOR: No lymphadenopathy.  VESSELS:No pulmonary embolism. The pulmonary artery measures 3.9 cm in   diameter. Aortic and coronary artery calcification.  HEART: Heart size is normal. No pericardial effusion.  CHEST WALL AND LOWER NECK: Within normal limits.  VISUALIZED UPPER ABDOMEN: Bilateral adrenal gland thickening. Partially   visualized right upper pole hypodense renal lesion measuring 12   Hounsfield units, likely a cyst but incompletely visualized.  BONES: Degenerative changes. Age indeterminate loss of height of T12   vertebral body.    IMPRESSION:    No pulmonary embolism.    No pneumonia.    --- End of Report ---      ELLY MCKENZIE MD; Resident Radiologist  This document has been electronically signed.  SHIVAM FUNES MD; Attending Radiologist  This document has been electronically signed. May 27 2023  8:59AM

## 2023-05-27 NOTE — ED ADULT NURSE NOTE - NSFALLRISKINTERV_ED_ALL_ED

## 2023-05-27 NOTE — H&P ADULT - NSHPSOCIALHISTORY_GEN_ALL_CORE
Pt drinks one bottle daily. Pt drinks one bottle of wine almost daily. Last drink on Thursday night. Lives alone at home. Uses cane at home (no stairs). Denied smoking or illicit drug use.

## 2023-05-27 NOTE — ED ADULT NURSE REASSESSMENT NOTE - NS ED NURSE REASSESS COMMENT FT1
BREAK RN: Pt alert and orientedx4, in no apparent distress. Pt denies chest pain, SOB, lightheadedness, pain. Pt going to OR, report given. Pt instructed to remove all clothing and jewelry. pt belongings with patient and to be locked up downstairs as per OR RN. Call bell within reach, bed in lowest position, will continue to monitor.

## 2023-05-27 NOTE — ED ADULT TRIAGE NOTE - CHIEF COMPLAINT QUOTE
Pt arrives from [facility] for GI consult.  Pt seen at c/o Abd pain with N/V for 1 week.  Dx with cholangitis.  Hx HTN, HLD.  Pt had recent admission at [facility] and d/c home.  20g LAC by [facility].  Zosyn at 20:30, Magnesium 2 grams at 21:30 for low Magnesium level = 1.5.  Morphine 4mg at 21:11.  Zofran 4mg, NS, Tylenol 650mg.

## 2023-05-28 ENCOUNTER — TRANSCRIPTION ENCOUNTER (OUTPATIENT)
Age: 83
End: 2023-05-28

## 2023-05-28 LAB
ALBUMIN SERPL ELPH-MCNC: 3.4 G/DL — SIGNIFICANT CHANGE UP (ref 3.3–5)
ALP SERPL-CCNC: 444 U/L — HIGH (ref 40–120)
ALT FLD-CCNC: 141 U/L — HIGH (ref 4–33)
ANION GAP SERPL CALC-SCNC: 12 MMOL/L — SIGNIFICANT CHANGE UP (ref 7–14)
AST SERPL-CCNC: 82 U/L — HIGH (ref 4–32)
BASOPHILS # BLD AUTO: 0.02 K/UL — SIGNIFICANT CHANGE UP (ref 0–0.2)
BASOPHILS NFR BLD AUTO: 0.1 % — SIGNIFICANT CHANGE UP (ref 0–2)
BILIRUB SERPL-MCNC: 2.2 MG/DL — HIGH (ref 0.2–1.2)
BUN SERPL-MCNC: 23 MG/DL — SIGNIFICANT CHANGE UP (ref 7–23)
CALCIUM SERPL-MCNC: 8.5 MG/DL — SIGNIFICANT CHANGE UP (ref 8.4–10.5)
CHLORIDE SERPL-SCNC: 97 MMOL/L — LOW (ref 98–107)
CO2 SERPL-SCNC: 25 MMOL/L — SIGNIFICANT CHANGE UP (ref 22–31)
CREAT SERPL-MCNC: 0.76 MG/DL — SIGNIFICANT CHANGE UP (ref 0.5–1.3)
EGFR: 77 ML/MIN/1.73M2 — SIGNIFICANT CHANGE UP
EOSINOPHIL # BLD AUTO: 0 K/UL — SIGNIFICANT CHANGE UP (ref 0–0.5)
EOSINOPHIL NFR BLD AUTO: 0 % — SIGNIFICANT CHANGE UP (ref 0–6)
GLUCOSE SERPL-MCNC: 142 MG/DL — HIGH (ref 70–99)
GRAM STN FLD: SIGNIFICANT CHANGE UP
HCT VFR BLD CALC: 35.9 % — SIGNIFICANT CHANGE UP (ref 34.5–45)
HGB BLD-MCNC: 11.6 G/DL — SIGNIFICANT CHANGE UP (ref 11.5–15.5)
IANC: 14.46 K/UL — HIGH (ref 1.8–7.4)
IMM GRANULOCYTES NFR BLD AUTO: 0.8 % — SIGNIFICANT CHANGE UP (ref 0–0.9)
LYMPHOCYTES # BLD AUTO: 0.42 K/UL — LOW (ref 1–3.3)
LYMPHOCYTES # BLD AUTO: 2.8 % — LOW (ref 13–44)
MAGNESIUM SERPL-MCNC: 2.4 MG/DL — SIGNIFICANT CHANGE UP (ref 1.6–2.6)
MCHC RBC-ENTMCNC: 29.4 PG — SIGNIFICANT CHANGE UP (ref 27–34)
MCHC RBC-ENTMCNC: 32.3 GM/DL — SIGNIFICANT CHANGE UP (ref 32–36)
MCV RBC AUTO: 90.9 FL — SIGNIFICANT CHANGE UP (ref 80–100)
MONOCYTES # BLD AUTO: 0.2 K/UL — SIGNIFICANT CHANGE UP (ref 0–0.9)
MONOCYTES NFR BLD AUTO: 1.3 % — LOW (ref 2–14)
NEUTROPHILS # BLD AUTO: 14.46 K/UL — HIGH (ref 1.8–7.4)
NEUTROPHILS NFR BLD AUTO: 95 % — HIGH (ref 43–77)
NRBC # BLD: 0 /100 WBCS — SIGNIFICANT CHANGE UP (ref 0–0)
NRBC # FLD: 0 K/UL — SIGNIFICANT CHANGE UP (ref 0–0)
PHOSPHATE SERPL-MCNC: 4 MG/DL — SIGNIFICANT CHANGE UP (ref 2.5–4.5)
PLATELET # BLD AUTO: 167 K/UL — SIGNIFICANT CHANGE UP (ref 150–400)
POTASSIUM SERPL-MCNC: 4 MMOL/L — SIGNIFICANT CHANGE UP (ref 3.5–5.3)
POTASSIUM SERPL-SCNC: 4 MMOL/L — SIGNIFICANT CHANGE UP (ref 3.5–5.3)
PROT SERPL-MCNC: 6.6 G/DL — SIGNIFICANT CHANGE UP (ref 6–8.3)
RBC # BLD: 3.95 M/UL — SIGNIFICANT CHANGE UP (ref 3.8–5.2)
RBC # FLD: 14.1 % — SIGNIFICANT CHANGE UP (ref 10.3–14.5)
SODIUM SERPL-SCNC: 134 MMOL/L — LOW (ref 135–145)
WBC # BLD: 15.22 K/UL — HIGH (ref 3.8–10.5)
WBC # FLD AUTO: 15.22 K/UL — HIGH (ref 3.8–10.5)

## 2023-05-28 PROCEDURE — 99232 SBSQ HOSP IP/OBS MODERATE 35: CPT

## 2023-05-28 RX ORDER — NALTREXONE HYDROCHLORIDE 50 MG/1
50 TABLET, FILM COATED ORAL DAILY
Refills: 0 | Status: DISCONTINUED | OUTPATIENT
Start: 2023-05-28 | End: 2023-05-29

## 2023-05-28 RX ADMIN — Medication 100 MILLIGRAM(S): at 11:25

## 2023-05-28 RX ADMIN — Medication 1 MILLIGRAM(S): at 23:21

## 2023-05-28 RX ADMIN — PIPERACILLIN AND TAZOBACTAM 25 GRAM(S): 4; .5 INJECTION, POWDER, LYOPHILIZED, FOR SOLUTION INTRAVENOUS at 06:20

## 2023-05-28 RX ADMIN — NALTREXONE HYDROCHLORIDE 50 MILLIGRAM(S): 50 TABLET, FILM COATED ORAL at 16:01

## 2023-05-28 RX ADMIN — Medication 1 MILLIGRAM(S): at 11:25

## 2023-05-28 RX ADMIN — ESCITALOPRAM OXALATE 20 MILLIGRAM(S): 10 TABLET, FILM COATED ORAL at 11:26

## 2023-05-28 RX ADMIN — PIPERACILLIN AND TAZOBACTAM 25 GRAM(S): 4; .5 INJECTION, POWDER, LYOPHILIZED, FOR SOLUTION INTRAVENOUS at 22:04

## 2023-05-28 RX ADMIN — PANTOPRAZOLE SODIUM 40 MILLIGRAM(S): 20 TABLET, DELAYED RELEASE ORAL at 06:20

## 2023-05-28 RX ADMIN — ATORVASTATIN CALCIUM 40 MILLIGRAM(S): 80 TABLET, FILM COATED ORAL at 22:04

## 2023-05-28 RX ADMIN — PIPERACILLIN AND TAZOBACTAM 25 GRAM(S): 4; .5 INJECTION, POWDER, LYOPHILIZED, FOR SOLUTION INTRAVENOUS at 15:09

## 2023-05-28 RX ADMIN — Medication 1 DROP(S): at 12:24

## 2023-05-28 NOTE — PROGRESS NOTE ADULT - SUBJECTIVE AND OBJECTIVE BOX
Patient is a 82y old  Female who presents with a chief complaint of concern for choledocholithiasis (27 May 2023 12:35)      INTERVAL HPI/OVERNIGHT EVENTS:      REVIEW OF SYSTEMS:  CONSTITUTIONAL: No fever, weight loss, or fatigue  EYES: No eye pain, visual disturbances, or discharge  ENMT:  No difficulty hearing, tinnitus, vertigo; No sinus or throat pain  NECK: No pain or stiffness  BREASTS: No pain, masses, or nipple discharge  RESPIRATORY: No cough, wheezing, chills or hemoptysis; No shortness of breath  CARDIOVASCULAR: No chest pain, palpitations, dizziness, or leg swelling  GASTROINTESTINAL: No abdominal or epigastric pain. No nausea, vomiting, or hematemesis; No diarrhea or constipation. No melena or hematochezia.  GENITOURINARY: No dysuria, frequency, hematuria, or incontinence  NEUROLOGICAL: No headaches, memory loss, loss of strength, numbness, or tremors  SKIN: No itching, burning, rashes, or lesions   LYMPH NODES: No enlarged glands  ENDOCRINE: No heat or cold intolerance; No hair loss  MUSCULOSKELETAL: No joint pain or swelling; No muscle, back, or extremity pain  PSYCHIATRIC: No depression, anxiety, mood swings, or difficulty sleeping  HEME/LYMPH: No easy bruising, or bleeding gums  ALLERGY AND IMMUNOLOGIC: No hives or eczema    FAMILY HISTORY:  FH: type 2 diabetes (Father)        Vital Signs Last 24 Hrs  T(C): 36.9 (28 May 2023 05:30), Max: 37.3 (27 May 2023 15:10)  T(F): 98.5 (28 May 2023 05:30), Max: 99.2 (27 May 2023 15:10)  HR: 85 (28 May 2023 05:30) (75 - 101)  BP: 153/80 (28 May 2023 05:30) (120/61 - 158/67)  BP(mean): 85 (27 May 2023 20:00) (70 - 91)  RR: 18 (28 May 2023 05:30) (13 - 19)  SpO2: 100% (28 May 2023 05:30) (94% - 100%)    Parameters below as of 28 May 2023 05:30  Patient On (Oxygen Delivery Method): room air        No Known Allergies      PHYSICAL EXAM:  GENERAL: NAD, well-groomed, well-developed  HEAD:  Atraumatic, Normocephalic  EYES: EOMI, PERRLA, conjunctiva and sclera clear  ENMT: No tonsillar erythema, exudates, or enlargement; Moist mucous membranes, Good dentition, No lesions  NECK: Supple, No JVD, Normal thyroid  NERVOUS SYSTEM:  Alert & Oriented X3, Good concentration; Motor Strength 5/5 B/L upper and lower extremities; DTRs 2+ intact and symmetric  CHEST/LUNG: Clear to percussion bilaterally; No rales, rhonchi, wheezing, or rubs  HEART: Regular rate and rhythm; No murmurs, rubs, or gallops  ABDOMEN: Soft, Nontender, Nondistended; Bowel sounds present  EXTREMITIES:  2+ Peripheral Pulses, No clubbing, cyanosis, or edema  LYMPH: No lymphadenopathy noted  SKIN: No rashes or lesions    Consultant(s) Notes Reviewed:  [x ] YES  [ ] NO  Care Discussed with Consultants/Other Providers [ x] YES  [ ] NO    LABS:        RADIOLOGY & ADDITIONAL TESTS:    Imaging Personally Reviewed:  [ ] YES  [ ] NO    acetaminophen     Tablet .. 650 milliGRAM(s) Oral every 6 hours PRN  artificial  tears Solution 1 Drop(s) Both EYES daily  atorvastatin 40 milliGRAM(s) Oral at bedtime  enoxaparin Injectable 40 milliGRAM(s) SubCutaneous every 24 hours  escitalopram 20 milliGRAM(s) Oral daily  folic acid 1 milliGRAM(s) Oral daily  HYDROmorphone  Injectable 0.5 milliGRAM(s) IV Push every 10 minutes PRN  lactated ringers. 1000 milliLiter(s) IV Continuous <Continuous>  LORazepam   Injectable 1 milliGRAM(s) IV Push every 2 hours PRN  LORazepam   Injectable 1 milliGRAM(s) IV Push every 1 hour PRN  ondansetron Injectable 4 milliGRAM(s) IV Push once PRN  oxyCODONE    IR 5 milliGRAM(s) Oral once PRN  pantoprazole    Tablet 40 milliGRAM(s) Oral before breakfast  piperacillin/tazobactam IVPB.. 3.375 Gram(s) IV Intermittent every 8 hours  thiamine 100 milliGRAM(s) Oral daily      HEALTH ISSUES - PROBLEM Dx:  Choledocholithiasis    Alcohol use disorder    Hypertension    Hyperlipidemia    GERD (gastroesophageal reflux disease)    Anxiety and depression    Encounter for deep vein thrombosis (DVT) prophylaxis             Patient is a 82y old  Female who presents with a chief complaint of concern for choledocholithiasis (27 May 2023 12:35)      INTERVAL HPI/OVERNIGHT EVENTS: NAEON.    Patient seen and examined at bedside. Stated she is feeling much better, no abdominal pain. Pt complaining of last night she ripped out her IV and felt bad. Not ready to eat solid food yet. Denied nausea, vomiting, abdominal pain, fever or chills.      REVIEW OF SYSTEMS:  CONSTITUTIONAL: No fever, weight loss, or fatigue  RESPIRATORY: No cough, wheezing, chills or hemoptysis; No shortness of breath  CARDIOVASCULAR: No chest pain, palpitations, dizziness, or leg swelling  GASTROINTESTINAL: No abdominal or epigastric pain. No nausea, vomiting, or hematemesis; No diarrhea or constipation. No melena or hematochezia.  GENITOURINARY: No dysuria, frequency, hematuria, or incontinence  NEUROLOGICAL: No headaches, memory loss, loss of strength, numbness, or tremors  SKIN: No itching, burning, rashes, or lesions   LYMPH NODES: No enlarged glands  ENDOCRINE: No heat or cold intolerance; No hair loss  MUSCULOSKELETAL: No joint pain or swelling; No muscle, back, or extremity pain      FAMILY HISTORY:  FH: type 2 diabetes (Father)        Vital Signs Last 24 Hrs  T(C): 36.9 (28 May 2023 05:30), Max: 37.3 (27 May 2023 15:10)  T(F): 98.5 (28 May 2023 05:30), Max: 99.2 (27 May 2023 15:10)  HR: 85 (28 May 2023 05:30) (75 - 101)  BP: 153/80 (28 May 2023 05:30) (120/61 - 158/67)  BP(mean): 85 (27 May 2023 20:00) (70 - 91)  RR: 18 (28 May 2023 05:30) (13 - 19)  SpO2: 100% (28 May 2023 05:30) (94% - 100%)    Parameters below as of 28 May 2023 05:30  Patient On (Oxygen Delivery Method): room air    No Known Allergies      PHYSICAL EXAM:  GENERAL: NAD, well-groomed, well-developed  HEAD:  Atraumatic, Normocephalic  EYES: EOMI, PERRLA, conjunctiva and sclera clear  ENMT: No tonsillar erythema, exudates, or enlargement; Moist mucous membranes, Good dentition, No lesions  NECK: Supple, No JVD, Normal thyroid  NERVOUS SYSTEM:  Alert & Oriented X3, Good concentration; Motor Strength 5/5 B/L upper and lower extremities; DTRs 2+ intact and symmetric  CHEST/LUNG: Clear to percussion bilaterally; No rales, rhonchi, wheezing, or rubs  HEART: Regular rate and rhythm; No murmurs, rubs, or gallops  ABDOMEN: Soft, Nontender, Nondistended; Bowel sounds present  EXTREMITIES:  2+ Peripheral Pulses, No clubbing, cyanosis, or edema  LYMPH: No lymphadenopathy noted  SKIN: No rashes or lesions    Consultant(s) Notes Reviewed:  [x ] YES  [ ] NO  Care Discussed with Consultants/Other Providers [ x] YES  [ ] NO    LABS:    CBC Full  -  ( 28 May 2023 05:18 )  WBC Count : 15.22 K/uL  RBC Count : 3.95 M/uL  Hemoglobin : 11.6 g/dL  Hematocrit : 35.9 %  Platelet Count - Automated : 167 K/uL  Mean Cell Volume : 90.9 fL  Mean Cell Hemoglobin : 29.4 pg  Mean Cell Hemoglobin Concentration : 32.3 gm/dL  Auto Neutrophil # : 14.46 K/uL  Auto Lymphocyte # : 0.42 K/uL  Auto Monocyte # : 0.20 K/uL  Auto Eosinophil # : 0.00 K/uL  Auto Basophil # : 0.02 K/uL  Auto Neutrophil % : 95.0 %  Auto Lymphocyte % : 2.8 %  Auto Monocyte % : 1.3 %  Auto Eosinophil % : 0.0 %  Auto Basophil % : 0.1 %    05-28    134<L>  |  97<L>  |  23  ----------------------------<  142<H>  4.0   |  25  |  0.76    Ca    8.5      28 May 2023 05:18  Phos  4.0     05-28  Mg     2.40     05-28    TPro  6.6  /  Alb  3.4  /  TBili  2.2<H>  /  DBili  x   /  AST  82<H>  /  ALT  141<H>  /  AlkPhos  444<H>  05-28      RADIOLOGY & ADDITIONAL TESTS:    Imaging Personally Reviewed:  [ ] YES  [ ] NO    acetaminophen     Tablet .. 650 milliGRAM(s) Oral every 6 hours PRN  artificial  tears Solution 1 Drop(s) Both EYES daily  atorvastatin 40 milliGRAM(s) Oral at bedtime  enoxaparin Injectable 40 milliGRAM(s) SubCutaneous every 24 hours  escitalopram 20 milliGRAM(s) Oral daily  folic acid 1 milliGRAM(s) Oral daily  HYDROmorphone  Injectable 0.5 milliGRAM(s) IV Push every 10 minutes PRN  lactated ringers. 1000 milliLiter(s) IV Continuous <Continuous>  LORazepam   Injectable 1 milliGRAM(s) IV Push every 2 hours PRN  LORazepam   Injectable 1 milliGRAM(s) IV Push every 1 hour PRN  ondansetron Injectable 4 milliGRAM(s) IV Push once PRN  oxyCODONE    IR 5 milliGRAM(s) Oral once PRN  pantoprazole    Tablet 40 milliGRAM(s) Oral before breakfast  piperacillin/tazobactam IVPB.. 3.375 Gram(s) IV Intermittent every 8 hours  thiamine 100 milliGRAM(s) Oral daily      HEALTH ISSUES - PROBLEM Dx:  Choledocholithiasis    Alcohol use disorder    Hypertension    Hyperlipidemia    GERD (gastroesophageal reflux disease)    Anxiety and depression    Encounter for deep vein thrombosis (DVT) prophylaxis

## 2023-05-28 NOTE — DISCHARGE NOTE PROVIDER - NSDCMRMEDTOKEN_GEN_ALL_CORE_FT
amLODIPine 5 mg oral tablet: 1 orally once a day  escitalopram 20 mg oral tablet: 1 orally once a day  Folic acid 1 mg: Take once daily.  losartan 100 mg oral tablet: 1 orally once a day  metoprolol succinate 25 mg oral tablet, extended release: 1 orally once a day  pantoprazole 40 mg oral delayed release tablet: 1 orally once a day  rosuvastatin 10 mg oral tablet: 1 orally once a day (at bedtime)  thiamine 100 mg oral tablet: 1 orally once a day   amLODIPine 5 mg oral tablet: 1 orally once a day  escitalopram 20 mg oral tablet: 1 orally once a day  folic acid 1 mg oral tablet: 1 tab(s) orally once a day  losartan 100 mg oral tablet: 1 orally once a day  metoprolol succinate 25 mg oral tablet, extended release: 1 orally once a day  pantoprazole 40 mg oral delayed release tablet: 1 orally once a day  rosuvastatin 10 mg oral tablet: 1 orally once a day (at bedtime)  thiamine 100 mg oral tablet: 1 orally once a day   amLODIPine 5 mg oral tablet: 1 orally once a day  amoxicillin-clavulanate 875 mg-125 mg oral tablet: 1 tab(s) orally every 8 hours  escitalopram 20 mg oral tablet: 1 orally once a day  folic acid 1 mg oral tablet: 1 tab(s) orally once a day  losartan 100 mg oral tablet: 1 orally once a day  metoprolol succinate 25 mg oral tablet, extended release: 1 orally once a day  pantoprazole 40 mg oral delayed release tablet: 1 orally once a day  rosuvastatin 10 mg oral tablet: 1 orally once a day (at bedtime)  thiamine 100 mg oral tablet: 1 orally once a day

## 2023-05-28 NOTE — DISCHARGE NOTE PROVIDER - NSFOLLOWUPCLINICS_GEN_ALL_ED_FT
[facility]  Gastroenterology  [address] [place] [id number]  Phone: [phone]  Fax:   Follow Up Time: 2 months

## 2023-05-28 NOTE — PROGRESS NOTE ADULT - PROBLEM SELECTOR PLAN 2
Pt w/ hx of almost daily alcohol use (bottle of wine). Last drink on Thursday night (5/18). Still within window of 24-72 hours. No prior hx of withdrawal (including seizures).    -sx triggered CIWA  -c/w home thiamine and folic acid  - fall & seizure precautions    - zofran 4mg IV Q8H PRN nausea and/or vomiting  - monitor electrolytes & replete PRN  -  consult

## 2023-05-28 NOTE — PHYSICAL THERAPY INITIAL EVALUATION ADULT - FOLLOWS COMMANDS/ANSWERS QUESTIONS, REHAB EVAL
No new care gaps identified.  Powered by [employer]. Reference number: [id number].   5/02/2022 7:52:51 AM CDT  
Refill request. Please advise.  
100% of the time

## 2023-05-28 NOTE — PHYSICAL THERAPY INITIAL EVALUATION ADULT - PERTINENT HX OF CURRENT PROBLEM, REHAB EVAL
Patient is 82 year old female with PMHx of HTN, etoh use (one bottle of wine daily, no hx withdrawal seizures) presenting from [facility] for concerns of choledocholithiasis, found to have elevated transaminases, hyperbilirubinemia and choledocholithiasis vs soft tissue mass per CT read.

## 2023-05-28 NOTE — DISCHARGE NOTE PROVIDER - HOSPITAL COURSE
82y female with PMHx of HTN, etoh use (one bottle of wine daily, no hx withdrawal seizures) presenting from [facility] for concerns of choledocholithiasis, found to have elevated transaminases, hyperbilirubinemia and choledocholithiasis vs soft tissue mass per CT read. Pt had ERCP which showed dilated CBD and stones. Pt had stent placed and instructed to have repeat ERCP in 2-3 months. Pt's blood cx were ____.     Pt also started on naltrexone for alcohol use disorder.     82y female with PMHx of HTN, etoh use (one bottle of wine daily, no hx withdrawal seizures) presenting from [facility] for concerns of choledocholithiasis, found to have elevated transaminases, hyperbilirubinemia and choledocholithiasis vs soft tissue mass per CT read. Pt had ERCP which showed dilated CBD and stones. Pt had stent placed and instructed to have repeat ERCP in 2-3 months. Pt's LFTs continued to improve throughout hospitalization.     Pt also started on naltrexone for alcohol use disorder but discontinued as pt reported visual hallucinations after starting medication. Pt will be discharged on oral antibiotics as blood culture was positive for E. coli and sensitive to augmentin for 14 day course.    On day of discharge, pt is afebrile and medically stable to be discharged home with Home PT. Pt will follow up with PCP in 1-2 weeks after DC and GI for ERCP in 2-3 months for stent removal. 82y female with PMHx of HTN, etoh use (one bottle of wine daily, no hx withdrawal seizures) presenting from [facility] for concerns of choledocholithiasis, found to have elevated transaminases, hyperbilirubinemia and choledocholithiasis vs soft tissue mass per CT read. Pt had ERCP which showed dilated CBD and stones. Pt had stent placed and instructed to have repeat ERCP in 2-3 months. Pt's LFTs continued to improve throughout hospitalization.     Pt also started on naltrexone for alcohol use disorder but discontinued as pt reported visual hallucinations after starting medication. Pt will be discharged on oral antibiotic (augmentin 875 mg TID) as blood culture was positive for E. coli and sensitive to augmentin for 14 day course.    On day of discharge, pt is afebrile and medically stable to be discharged home with Home PT. Pt will follow up with PCP in 1-2 weeks after DC and GI for ERCP in 2-3 months for stent removal.

## 2023-05-28 NOTE — PROGRESS NOTE ADULT - ASSESSMENT
82y female with PMHx of HTN, etoh use (one bottle of wine daily, no hx withdrawal seizures) presenting from [facility] for concerns of choledocholithiasis, found to have elevated transaminases, hyperbilirubinemia and choledocholithiasis vs soft tissue mass per CT read.

## 2023-05-28 NOTE — PROGRESS NOTE ADULT - PROBLEM SELECTOR PLAN 1
Pt presenting w/ hyperbilirubinemia, elevated transaminases. CT with CBD dilated to 1.5cm and abrupt narrowing of CBD distally. Soft tissue density. No pancreatic duct dilation. US with no CBD dilation.     - appreciate GI recs  - continue zosyn  - blood cx obtained  - keep NPO for tentative ERCP today   - if ERCP does not happen today, will keep on CLD  - trend CMP, CBC daily   - wean O2 as tolerated Pt presenting w/ hyperbilirubinemia, elevated transaminases. CT with CBD dilated to 1.5cm and abrupt narrowing of CBD distally. Soft tissue density. No pancreatic duct dilation. US with no CBD dilation.     - appreciate GI recs  - continue zosyn  - blood cx   - s/p ERCP with stent placement in CB; repeat ERCP in 2-3 mos  - CLD for now, advance as tolerated  - trend CMP, CBC daily   - wean O2 as tolerated

## 2023-05-28 NOTE — PROGRESS NOTE ADULT - ASSESSMENT
Impression:     #choledocholithiasis vs soft tissue mass per CT read   #hyperbilirubinemia  #elevated transaminases   -elevated Tbili likely related to CT findings. Elevated transaminases also indicative of cholestatic pattern   -[place] criteria grade 2 (cholangitis)   - s/p ERCP on 5/28 with pus from bile duct and 10F-7cm stent placed     #alcohol use disorder- no hx of withdrawal/seizures     Recommendations:    - continue abx (IV Zosyn while inpatient, can transition to augmentin or Fluoroquinolones for 5-7days as outpatient) given gram negative positive blood cultures at OSH   - CLD, advance as tolerated   - Hold Toradol for 24 hours as the pt received indomethacin for post-ERCP prophylaxis- CIWA for alcohol withdrawal   - Repeat ERCP in 2-3 months with Dr Garcia for stent and stone removal.  - trend CMP, CBC daily   - rest of care per primary team     All recommendations are tentative until note is attested by attending.     Cathy Aceves, PGY-4   Gastroenterology/Hepatology Fellow  Available on Microsoft Teams  [phone] ([facility] Short Range Pager)  [phone] ([facility] Long Range Pager)    After 5pm, please contact the on-call GI fellow. [phone] Impression:     #choledocholithiasis vs soft tissue mass per CT read   #hyperbilirubinemia  #elevated transaminases   -elevated Tbili likely related to CT findings. Elevated transaminases also indicative of cholestatic pattern   -[place] criteria grade 2 (cholangitis)   - s/p ERCP on 5/28 with pus from bile duct and 10F-7cm stent placed     #alcohol use disorder- no hx of withdrawal/seizures     Recommendations:    - continue abx (IV Zosyn while inpatient, can transition to augmentin or Fluoroquinolones for 5-7days as outpatient) given gram negative positive blood cultures at OSH   - CLD, advance as tolerated   - Hold Toradol for 24 hours as the pt received indomethacin for post-ERCP prophylaxis- CIWA for alcohol withdrawal   - Repeat ERCP in 2-3 months with Dr Garcia for stent and stone removal  - Please provide patient with Gastroenterology Clinic to confirm appointment/for outpatient follow up; [phone] ([facility] Practice at [address] [place] [address])  - trend CMP, CBC daily   - rest of care per primary team     All recommendations are tentative until note is attested by attending.     Cathy Aceves, PGY-4   Gastroenterology/Hepatology Fellow  Available on [employer] Teams  [phone] ([employer] Short Range Pager)  [phone] ([facility] Long Range Pager)    After 5pm, please contact the on-call GI fellow. [phone] Impression:     #choledocholithiasis vs soft tissue mass per CT read   #hyperbilirubinemia  #elevated transaminases   -elevated Tbili likely related to CT findings. Elevated transaminases also indicative of cholestatic pattern   -[place] criteria grade 2 (cholangitis)   - s/p ERCP on 5/28 with pus from bile duct and 10F-7cm stent placed     #alcohol use disorder- no hx of withdrawal/seizures     Recommendations:    - continue abx (IV Zosyn while inpatient, can transition to augmentin or Fluoroquinolones for 5-7days as outpatient) given gram negative positive blood cultures at OSH   - repeat blood cultures today given 48 hours of abx treatment   - CLD, advance as tolerated   - Hold Toradol for 24 hours as the pt received indomethacin for post-ERCP prophylaxis- CIWA for alcohol withdrawal   - Repeat ERCP in 2-3 months with Dr Garcia for stent and stone removal  - Please provide patient with Gastroenterology Clinic to confirm appointment/for outpatient follow up; [phone] (Faculty Practice at [address] [place] [address])  - trend CMP, CBC daily   - rest of care per primary team     All recommendations are tentative until note is attested by attending.     Cathy Aceves, PGY-4   Gastroenterology/Hepatology Fellow  Available on Microsoft Teams  [phone] ([facility] Short Range Pager)  [phone] ([facility] Long Range Pager)    After 5pm, please contact the on-call GI fellow. [phone] Impression:     #choledocholithiasis vs soft tissue mass per CT read   #hyperbilirubinemia  #elevated transaminases   -elevated Tbili likely related to CT findings. Elevated transaminases also indicative of cholestatic pattern   -[place] criteria grade 2 (cholangitis)   - s/p ERCP on 5/28 with pus from bile duct and 10F-7cm stent placed     #alcohol use disorder- no hx of withdrawal/seizures     Recommendations:    - continue abx (IV Zosyn while inpatient, can transition to augmentin or Fluoroquinolones for 5-7days as outpatient) given gram negative positive blood cultures at OSH   - repeat blood cultures today given 48 hours of abx treatment   - CLD, advance as tolerated   - Hold Toradol for 24 hours as the pt received indomethacin for post-ERCP prophylaxis- CIWA for alcohol withdrawal   - Repeat ERCP in 2-3 months with Dr Garcia for stent and stone removal  - Please provide patient with Gastroenterology Clinic to confirm appointment/for outpatient follow up; [phone] (Faculty Practice at [address] [place] [address])  - stable for discharge   - patient will need cholecystectomy in near future (can be pursued outpt)   - trend CMP, CBC daily   - rest of care per primary team     All recommendations are tentative until note is attested by attending.     Cathy Aceves, PGY-4   Gastroenterology/Hepatology Fellow  Available on Microsoft Teams  [phone] ([employer] Short Range Pager)  [phone] ([facility] Long Range Pager)    After 5pm, please contact the on-call GI fellow. [phone]

## 2023-05-28 NOTE — DISCHARGE NOTE PROVIDER - NSDCCPTREATMENT_GEN_ALL_CORE_FT
PRINCIPAL PROCEDURE  Procedure: CTA chest w/w/o contrast  Findings and Treatment: CC: [id number] EXAM:  CT ANGIO CHEST PULM ART [facility]   ORDERED BY: TAJ DUMONT   PROCEDURE DATE:  05/27/2023    INTERPRETATION:  CLINICAL INFORMATION: New hypoxia and dyspnea on   < end of copied text >  exertion.  COMPARISON: Chest x-ray performed earlier onthe same day.  CONTRAST/COMPLICATIONS:  IV Contrast: Omnipaque 350  75 cc administered   25 cc discarded  Oral Contrast: NONE  Complications: None reported at time of study completion  PROCEDURE:  CT Angiography of the Chest.  Sagittal and coronal reformats were performed as well as 3D (MIP)   reconstructions.  FINDINGS:  LUNGS AND AIRWAYS: Patent central airways.  Mild bibasilar subsegmental   atelectasis.  PLEURA: No pleural effusion.  MEDIASTINUM AND NICANOR: No lymphadenopathy.  VESSELS:No pulmonary embolism. The pulmonary artery measures 3.9 cm in   diameter. Aortic and coronary artery calcification.  HEART: Heart size is normal. No pericardial effusion.  CHEST WALL AND LOWER NECK: Within normal limits.  VISUALIZED UPPER ABDOMEN: Bilateral adrenal gland thickening. Partially   visualized right upper pole hypodense renal lesion measuring 12   Hounsfield units, likely a cyst but incompletely visualized.  BONES: Degenerative changes. Age indeterminate loss of height of T12   vertebral body.  IMPRESSION:  No pulmonary embolism.  No pneumonia.  --- End of Report ---  ELLY MCKENZIE MD; Resident Radiologist  This document has been electronically signed.  SHIVAM FUNES MD; Attending Radiologist  This document has been electronically signed. May 27 2023  8:59AM< from: CT Angio Chest PE Protocol w/ IV Cont (05.27.23 @ 08:46) >

## 2023-05-28 NOTE — DISCHARGE NOTE PROVIDER - CARE PROVIDER_API CALL
Power Hernandes  Internal Medicine  [address], [address]  [place] [address]  Phone: [phone]  Fax: [phone]  Established Patient  Follow Up Time:

## 2023-05-28 NOTE — DISCHARGE NOTE PROVIDER - NSDCFUADDAPPT_GEN_ALL_CORE_FT
1. Please schedule a follow up appointment with your primary care doctor within 2 weeks of being discharged from the hospital.   2. Please schedule a follow up appointment with Gastroenterology Clinic to confirm appointment/for your outpatient follow up in due in 2-3 months; [phone] (Faculty Practice at [address] [place] [address])

## 2023-05-28 NOTE — DISCHARGE NOTE PROVIDER - NSDCCPCAREPLAN_GEN_ALL_CORE_FT
PRINCIPAL DISCHARGE DIAGNOSIS  Diagnosis: Cholelithiasis with cholangitis  Assessment and Plan of Treatment: You were found to have a dilated common bile duct with signs of impending infection. You had a procedure in which a stent was placed. You were also started on IV antibiotics. Please follow up with your gastroenterologist for repeat ERCP in 2-3 months.      SECONDARY DISCHARGE DIAGNOSES  Diagnosis: Alcohol use disorder  Assessment and Plan of Treatment: You were started on a medicine called naltrexone to cut cravings of alcohol use. Please refer to the resources provided by the  for help with stopping alcohol use.     PRINCIPAL DISCHARGE DIAGNOSIS  Diagnosis: Cholelithiasis with cholangitis  Assessment and Plan of Treatment: You were found to have a dilated common bile duct with signs of impending infection. You had a procedure in which a stent was placed. You were also started on IV antibiotics (Zosyn). You are being discharged with an oral antibiotic (Augmentin 875 mg 3 times/day) for 14 days. Please make sure to take all of the doses and complete the full 14 day course. Please follow up with your gastroenterologist for repeat ERCP in 2-3 months. Please seek immediate medical attention if you experience significant belly pain, fever, nausea/vomiting, or you notice yellowing of your eyes and skin.      SECONDARY DISCHARGE DIAGNOSES  Diagnosis: Alcohol use disorder  Assessment and Plan of Treatment: You were started on a medicine called naltrexone to cut cravings of alcohol use. This medication was discontinued when you said that you were having visual hallucinations. Please refer to the resources provided by the  for help with stopping alcohol use. Please seek immediate medical attention if you experience  - Sweating or a racing heart  - Hand trembling  - Nausea or vomiting  - Seeing, feeling, or hearing things that aren't really there (these are called "hallucinations")  - Seizures (these can be serious, even life-threatening)    Diagnosis: Hypertension  Assessment and Plan of Treatment: Your blood pressure medications were held when you were admitted to the hospital because of the stent placement procedure with the GI doctor. Your blood pressures started to increase while you were in the hospital, so your home blood pressure medications (amlodipine, metoprolol, losartan) were restarted. Please follow up with your primary care provider within 1-2 weeks of being discharged from the hospital to monitor your blood pressure and adjust your medications as needed. Please seek immediate medical attention if you experience:  - severe headache  - sudden vision changes   - chest pain  - nausea/vomiting

## 2023-05-28 NOTE — PROGRESS NOTE ADULT - PROBLEM SELECTOR PLAN 7
DVT ppx: lovenox    Diet: NPO    Dispo: pending ERCP DVT ppx: lovenox    Diet: CLD    Dispo: pending cx results DVT ppx: lovenox  Mild hyponatremia - Monitor hyponatremia. Check urine ltyes/osmol in the AM if worsens.     Dispo: pending cx results

## 2023-05-28 NOTE — PROGRESS NOTE ADULT - SUBJECTIVE AND OBJECTIVE BOX
Gastroenterology/Hepatology Progress Note    Interval Events:   - patient reports feeling much better, reports no abdominal pain   - reports little rest ON given that she had difficulty finding restroom and pulled out all her wires by accident   - otherwise no complaints  - am feeling thirsty     Allergies:  No Known Allergies      Hospital Medications:  acetaminophen     Tablet .. 650 milliGRAM(s) Oral every 6 hours PRN  artificial  tears Solution 1 Drop(s) Both EYES daily  atorvastatin 40 milliGRAM(s) Oral at bedtime  enoxaparin Injectable 40 milliGRAM(s) SubCutaneous every 24 hours  escitalopram 20 milliGRAM(s) Oral daily  folic acid 1 milliGRAM(s) Oral daily  HYDROmorphone  Injectable 0.5 milliGRAM(s) IV Push every 10 minutes PRN  lactated ringers. 1000 milliLiter(s) IV Continuous <Continuous>  LORazepam   Injectable 1 milliGRAM(s) IV Push every 1 hour PRN  LORazepam   Injectable 1 milliGRAM(s) IV Push every 2 hours PRN  ondansetron Injectable 4 milliGRAM(s) IV Push once PRN  oxyCODONE    IR 5 milliGRAM(s) Oral once PRN  pantoprazole    Tablet 40 milliGRAM(s) Oral before breakfast  piperacillin/tazobactam IVPB.. 3.375 Gram(s) IV Intermittent every 8 hours  thiamine 100 milliGRAM(s) Oral daily      ROS: 14 point ROS negative unless otherwise state in subjective    PHYSICAL EXAM:   Vital Signs:  Vital Signs Last 24 Hrs  T(C): 36.9 (28 May 2023 05:30), Max: 37.3 (27 May 2023 15:10)  T(F): 98.5 (28 May 2023 05:30), Max: 99.2 (27 May 2023 15:10)  HR: 85 (28 May 2023 05:30) (75 - 101)  BP: 153/80 (28 May 2023 05:30) (120/61 - 158/67)  BP(mean): 85 (27 May 2023 20:00) (70 - 91)  RR: 18 (28 May 2023 05:30) (13 - 19)  SpO2: 100% (28 May 2023 05:30) (94% - 100%)    Parameters below as of 28 May 2023 05:30  Patient On (Oxygen Delivery Method): room air      Daily Height in cm: 165.1 (27 May 2023 15:32)    Daily     GENERAL:  No acute distress  HEENT:  NCAT, no scleral icterus  CHEST: no resp distress  HEART:  RRR  ABDOMEN:  Soft, non-tender, non-distended, no masses  EXTREMITIES:  No cyanosis, clubbing, or edema  SKIN:  No rash/erythema/ecchymoses/petechiae/wounds/abscess/warm/dry  NEURO:  Alert and oriented x 3     LABS:                        11.6   15.22 )-----------( 167      ( 28 May 2023 05:18 )             35.9     Mean Cell Volume: 90.9 fL (05-28-23 @ 05:18)    05-27    133<L>  |  95<L>  |  17  ----------------------------<  113<H>  3.7   |  25  |  0.68    Ca    8.1<L>      27 May 2023 05:05  Phos  3.4     05-27  Mg     2.10     05-27    TPro  6.4  /  Alb  3.6  /  TBili  5.2<H>  /  DBili  x   /  AST  137<H>  /  ALT  185<H>  /  AlkPhos  427<H>  05-27    LIVER FUNCTIONS - ( 27 May 2023 05:05 )  Alb: 3.6 g/dL / Pro: 6.4 g/dL / ALK PHOS: 427 U/L / ALT: 185 U/L / AST: 137 U/L / GGT: x           PT/INR - ( 27 May 2023 07:31 )   PT: 15.1 sec;   INR: 1.30 ratio         PTT - ( 27 May 2023 07:31 )  PTT:29.8 sec          Imaging:

## 2023-05-28 NOTE — PATIENT PROFILE ADULT - FALL HARM RISK - RISK INTERVENTIONS

## 2023-05-29 LAB
ALBUMIN SERPL ELPH-MCNC: 3.4 G/DL — SIGNIFICANT CHANGE UP (ref 3.3–5)
ALP SERPL-CCNC: 371 U/L — HIGH (ref 40–120)
ALT FLD-CCNC: 99 U/L — HIGH (ref 4–33)
ANION GAP SERPL CALC-SCNC: 9 MMOL/L — SIGNIFICANT CHANGE UP (ref 7–14)
AST SERPL-CCNC: 39 U/L — HIGH (ref 4–32)
BASOPHILS # BLD AUTO: 0.01 K/UL — SIGNIFICANT CHANGE UP (ref 0–0.2)
BASOPHILS NFR BLD AUTO: 0.1 % — SIGNIFICANT CHANGE UP (ref 0–2)
BILIRUB SERPL-MCNC: 1.3 MG/DL — HIGH (ref 0.2–1.2)
BUN SERPL-MCNC: 22 MG/DL — SIGNIFICANT CHANGE UP (ref 7–23)
CALCIUM SERPL-MCNC: 8.4 MG/DL — SIGNIFICANT CHANGE UP (ref 8.4–10.5)
CHLORIDE SERPL-SCNC: 98 MMOL/L — SIGNIFICANT CHANGE UP (ref 98–107)
CO2 SERPL-SCNC: 26 MMOL/L — SIGNIFICANT CHANGE UP (ref 22–31)
CREAT SERPL-MCNC: 0.78 MG/DL — SIGNIFICANT CHANGE UP (ref 0.5–1.3)
CULTURE RESULTS: SIGNIFICANT CHANGE UP
EGFR: 75 ML/MIN/1.73M2 — SIGNIFICANT CHANGE UP
EOSINOPHIL # BLD AUTO: 0.01 K/UL — SIGNIFICANT CHANGE UP (ref 0–0.5)
EOSINOPHIL NFR BLD AUTO: 0.1 % — SIGNIFICANT CHANGE UP (ref 0–6)
GLUCOSE SERPL-MCNC: 117 MG/DL — HIGH (ref 70–99)
HCT VFR BLD CALC: 33.7 % — LOW (ref 34.5–45)
HGB BLD-MCNC: 10.8 G/DL — LOW (ref 11.5–15.5)
IANC: 9.96 K/UL — HIGH (ref 1.8–7.4)
IMM GRANULOCYTES NFR BLD AUTO: 0.8 % — SIGNIFICANT CHANGE UP (ref 0–0.9)
LYMPHOCYTES # BLD AUTO: 0.7 K/UL — LOW (ref 1–3.3)
LYMPHOCYTES # BLD AUTO: 6.2 % — LOW (ref 13–44)
MAGNESIUM SERPL-MCNC: 2.3 MG/DL — SIGNIFICANT CHANGE UP (ref 1.6–2.6)
MCHC RBC-ENTMCNC: 29.4 PG — SIGNIFICANT CHANGE UP (ref 27–34)
MCHC RBC-ENTMCNC: 32 GM/DL — SIGNIFICANT CHANGE UP (ref 32–36)
MCV RBC AUTO: 91.8 FL — SIGNIFICANT CHANGE UP (ref 80–100)
MONOCYTES # BLD AUTO: 0.54 K/UL — SIGNIFICANT CHANGE UP (ref 0–0.9)
MONOCYTES NFR BLD AUTO: 4.8 % — SIGNIFICANT CHANGE UP (ref 2–14)
NEUTROPHILS # BLD AUTO: 9.96 K/UL — HIGH (ref 1.8–7.4)
NEUTROPHILS NFR BLD AUTO: 88 % — HIGH (ref 43–77)
NRBC # BLD: 0 /100 WBCS — SIGNIFICANT CHANGE UP (ref 0–0)
NRBC # FLD: 0 K/UL — SIGNIFICANT CHANGE UP (ref 0–0)
PHOSPHATE SERPL-MCNC: 2.9 MG/DL — SIGNIFICANT CHANGE UP (ref 2.5–4.5)
PLATELET # BLD AUTO: 188 K/UL — SIGNIFICANT CHANGE UP (ref 150–400)
POTASSIUM SERPL-MCNC: 3.7 MMOL/L — SIGNIFICANT CHANGE UP (ref 3.5–5.3)
POTASSIUM SERPL-SCNC: 3.7 MMOL/L — SIGNIFICANT CHANGE UP (ref 3.5–5.3)
PROT SERPL-MCNC: 6.4 G/DL — SIGNIFICANT CHANGE UP (ref 6–8.3)
RBC # BLD: 3.67 M/UL — LOW (ref 3.8–5.2)
RBC # FLD: 14.3 % — SIGNIFICANT CHANGE UP (ref 10.3–14.5)
SODIUM SERPL-SCNC: 133 MMOL/L — LOW (ref 135–145)
SPECIMEN SOURCE: SIGNIFICANT CHANGE UP
WBC # BLD: 11.31 K/UL — HIGH (ref 3.8–10.5)
WBC # FLD AUTO: 11.31 K/UL — HIGH (ref 3.8–10.5)

## 2023-05-29 PROCEDURE — 99232 SBSQ HOSP IP/OBS MODERATE 35: CPT | Mod: GC

## 2023-05-29 RX ORDER — BENZOCAINE AND MENTHOL 5; 1 G/100ML; G/100ML
1 LIQUID ORAL
Refills: 0 | Status: DISCONTINUED | OUTPATIENT
Start: 2023-05-29 | End: 2023-05-30

## 2023-05-29 RX ADMIN — PIPERACILLIN AND TAZOBACTAM 25 GRAM(S): 4; .5 INJECTION, POWDER, LYOPHILIZED, FOR SOLUTION INTRAVENOUS at 21:56

## 2023-05-29 RX ADMIN — PIPERACILLIN AND TAZOBACTAM 25 GRAM(S): 4; .5 INJECTION, POWDER, LYOPHILIZED, FOR SOLUTION INTRAVENOUS at 05:50

## 2023-05-29 RX ADMIN — PANTOPRAZOLE SODIUM 40 MILLIGRAM(S): 20 TABLET, DELAYED RELEASE ORAL at 05:50

## 2023-05-29 RX ADMIN — PIPERACILLIN AND TAZOBACTAM 25 GRAM(S): 4; .5 INJECTION, POWDER, LYOPHILIZED, FOR SOLUTION INTRAVENOUS at 14:38

## 2023-05-29 RX ADMIN — Medication 100 MILLIGRAM(S): at 11:32

## 2023-05-29 RX ADMIN — Medication 1 MILLIGRAM(S): at 11:32

## 2023-05-29 RX ADMIN — Medication 1 DROP(S): at 11:32

## 2023-05-29 RX ADMIN — ESCITALOPRAM OXALATE 20 MILLIGRAM(S): 10 TABLET, FILM COATED ORAL at 11:32

## 2023-05-29 RX ADMIN — ATORVASTATIN CALCIUM 40 MILLIGRAM(S): 80 TABLET, FILM COATED ORAL at 21:56

## 2023-05-29 RX ADMIN — BENZOCAINE AND MENTHOL 1 LOZENGE: 5; 1 LIQUID ORAL at 14:22

## 2023-05-29 NOTE — PROGRESS NOTE ADULT - ASSESSMENT
82y female with PMHx of HTN, etoh use (one bottle of wine daily, no hx withdrawal seizures) presenting from [facility] for concerns of choledocholithiasis, found to have elevated transaminases, hyperbilirubinemia and choledocholithiasis vs soft tissue mass per CT read. 82y female with PMHx of HTN, etoh use (one bottle of wine daily, no hx withdrawal seizures) presenting from [facility] for concerns of choledocholithiasis, found to have elevated transaminases, hyperbilirubinemia and choledocholithiasis vs soft tissue mass per CT read.

## 2023-05-29 NOTE — PROGRESS NOTE ADULT - PROBLEM SELECTOR PLAN 2
Pt w/ hx of almost daily alcohol use (bottle of wine). Last drink on Thursday night (5/18). Still within window of 24-72 hours. No prior hx of withdrawal (including seizures).    -sx triggered CIWA  -c/w home thiamine and folic acid  - fall & seizure precautions    - zofran 4mg IV Q8H PRN nausea and/or vomiting  - monitor electrolytes & replete PRN  -  consult Pt w/ hx of almost daily alcohol use (bottle of wine). Last drink on Thursday night (5/18). Still within window of 24-72 hours. No prior hx of withdrawal (including seizures).    -sx triggered CIWA  -c/w home thiamine and folic acid  - fall & seizure precautions    - zofran 4mg IV Q8H PRN nausea and/or vomiting  - monitor electrolytes & replete PRN  -  consult  - Discontinued naltrexone iso pt having ep of hallucinations

## 2023-05-29 NOTE — PROGRESS NOTE ADULT - SUBJECTIVE AND OBJECTIVE BOX
Internal Medicine   Angella Morocho | PGY-1    OVERNIGHT EVENTS: No acute overnight events.    SUBJECTIVE:       MEDICATIONS  (STANDING):  artificial  tears Solution 1 Drop(s) Both EYES daily  atorvastatin 40 milliGRAM(s) Oral at bedtime  enoxaparin Injectable 40 milliGRAM(s) SubCutaneous every 24 hours  escitalopram 20 milliGRAM(s) Oral daily  folic acid 1 milliGRAM(s) Oral daily  naltrexone 50 milliGRAM(s) Oral daily  pantoprazole    Tablet 40 milliGRAM(s) Oral before breakfast  piperacillin/tazobactam IVPB.. 3.375 Gram(s) IV Intermittent every 8 hours  thiamine 100 milliGRAM(s) Oral daily    MEDICATIONS  (PRN):  acetaminophen     Tablet .. 650 milliGRAM(s) Oral every 6 hours PRN Mild Pain (1 - 3), Moderate Pain (4 - 6)  LORazepam   Injectable 1 milliGRAM(s) IV Push every 1 hour PRN CIWA-Ar score 8 or greater  LORazepam   Injectable 1 milliGRAM(s) IV Push every 2 hours PRN CIWA-Ar score increase by 2 points and a total score of 7 or less        T(F): 98.6 (05-29-23 @ 01:30), Max: 98.6 (05-29-23 @ 01:30)  HR: 74 (05-29-23 @ 01:30) (74 - 88)  BP: 139/65 (05-29-23 @ 01:30) (138/77 - 149/86)  BP(mean): --  RR: 18 (05-29-23 @ 01:30) (17 - 19)  SpO2: 100% (05-29-23 @ 01:30) (97% - 100%)    PHYSICAL EXAM:     GENERAL: NAD, lying in bed comfortably  HEAD:  Atraumatic, Normocephalic  EYES: EOMI, PERRLA, conjunctiva and sclera clear, no nystagmus noted  ENT: Moist mucous membranes,   NECK: Supple, No JVD, trachea midline  CHEST/LUNG: Clear to auscultation bilaterally; No rales, rhonchi, wheezing, or rubs. Unlabored respirations  HEART: Regular rate and rhythm; No murmurs, rubs, or gallops, normal S1/S2  ABDOMEN: normal bowel sounds; Soft, nontender, nondistended, no organomegaly   EXTREMITIES:  2+ Peripheral Pulses, brisk capillary refill. No clubbing, cyanosis, or edema  MSK: No gross deformities noted   Neurological:  A&Ox3, no focal deficits   SKIN: No rashes or lesions  PSYCH: Normal mood, affect     TELEMETRY:    LABS:                        11.6   15.22 )-----------( 167      ( 28 May 2023 05:18 )             35.9     05-28    134<L>  |  97<L>  |  23  ----------------------------<  142<H>  4.0   |  25  |  0.76    Ca    8.5      28 May 2023 05:18  Phos  4.0     05-28  Mg     2.40     05-28    TPro  6.6  /  Alb  3.4  /  TBili  2.2<H>  /  DBili  x   /  AST  82<H>  /  ALT  141<H>  /  AlkPhos  444<H>  05-28        PT/INR - ( 27 May 2023 07:31 )   PT: 15.1 sec;   INR: 1.30 ratio         PTT - ( 27 May 2023 07:31 )  PTT:29.8 sec    Creatinine Trend: 0.76<--, 0.68<--  I&O's Summary    27 May 2023 07:01  -  28 May 2023 07:00  --------------------------------------------------------  IN: 270 mL / OUT: 150 mL / NET: 120 mL      BNP    RADIOLOGY & ADDITIONAL STUDIES:             Internal Medicine   Angella Bailee | PGY-1    OVERNIGHT EVENTS: No acute overnight events.    SUBJECTIVE: Patient was seen and examined at bedside this morning. Denies any nausea/vomiting/diarrhea, headache, shortness of breath, abdominal pain or chest pain/palpitations. Patient responding appropriately to questions and able to make needs known. Vital signs/imaging/telemetry events reviewed.       MEDICATIONS  (STANDING):  artificial  tears Solution 1 Drop(s) Both EYES daily  atorvastatin 40 milliGRAM(s) Oral at bedtime  enoxaparin Injectable 40 milliGRAM(s) SubCutaneous every 24 hours  escitalopram 20 milliGRAM(s) Oral daily  folic acid 1 milliGRAM(s) Oral daily  naltrexone 50 milliGRAM(s) Oral daily  pantoprazole    Tablet 40 milliGRAM(s) Oral before breakfast  piperacillin/tazobactam IVPB.. 3.375 Gram(s) IV Intermittent every 8 hours  thiamine 100 milliGRAM(s) Oral daily    MEDICATIONS  (PRN):  acetaminophen     Tablet .. 650 milliGRAM(s) Oral every 6 hours PRN Mild Pain (1 - 3), Moderate Pain (4 - 6)  LORazepam   Injectable 1 milliGRAM(s) IV Push every 1 hour PRN CIWA-Ar score 8 or greater  LORazepam   Injectable 1 milliGRAM(s) IV Push every 2 hours PRN CIWA-Ar score increase by 2 points and a total score of 7 or less        T(F): 98.6 (05-29-23 @ 01:30), Max: 98.6 (05-29-23 @ 01:30)  HR: 74 (05-29-23 @ 01:30) (74 - 88)  BP: 139/65 (05-29-23 @ 01:30) (138/77 - 149/86)  BP(mean): --  RR: 18 (05-29-23 @ 01:30) (17 - 19)  SpO2: 100% (05-29-23 @ 01:30) (97% - 100%)    PHYSICAL EXAM:     CONSTITUTIONAL: No fever, weight loss, or fatigue  RESPIRATORY: No cough, wheezing, chills or hemoptysis; No shortness of breath  CARDIOVASCULAR: No chest pain, palpitations, dizziness, or leg swelling  GASTROINTESTINAL: No abdominal or epigastric pain. No nausea, vomiting, or hematemesis; No diarrhea or constipation. No melena or hematochezia.  GENITOURINARY: No dysuria, frequency, hematuria, or incontinence  NEUROLOGICAL: No headaches, memory loss, loss of strength, numbness, or tremors  SKIN: No itching, burning, rashes, or lesions   LYMPH NODES: No enlarged glands  ENDOCRINE: No heat or cold intolerance; No hair loss  MUSCULOSKELETAL: No joint pain or swelling; No muscle, back, or extremity pain  TELEMETRY:    LABS:                        11.6   15.22 )-----------( 167      ( 28 May 2023 05:18 )             35.9     05-28    134<L>  |  97<L>  |  23  ----------------------------<  142<H>  4.0   |  25  |  0.76    Ca    8.5      28 May 2023 05:18  Phos  4.0     05-28  Mg     2.40     05-28    TPro  6.6  /  Alb  3.4  /  TBili  2.2<H>  /  DBili  x   /  AST  82<H>  /  ALT  141<H>  /  AlkPhos  444<H>  05-28        PT/INR - ( 27 May 2023 07:31 )   PT: 15.1 sec;   INR: 1.30 ratio         PTT - ( 27 May 2023 07:31 )  PTT:29.8 sec    Creatinine Trend: 0.76<--, 0.68<--  I&O's Summary    27 May 2023 07:01  -  28 May 2023 07:00  --------------------------------------------------------  IN: 270 mL / OUT: 150 mL / NET: 120 mL      BNP    RADIOLOGY & ADDITIONAL STUDIES:             Internal Medicine   Angella Bailee | PGY-1    OVERNIGHT EVENTS: No acute overnight events.    SUBJECTIVE: Patient was seen and examined at bedside this morning. Denies any nausea/vomiting/diarrhea, headache, shortness of breath, abdominal pain or chest pain/palpitations. Patient responding appropriately to questions and able to make needs known. Pt reports an episode of visual hallucinations in the evening before bedtime. Pt reports episodes have resolved. Vital signs/imaging/telemetry events reviewed.       MEDICATIONS  (STANDING):  artificial  tears Solution 1 Drop(s) Both EYES daily  atorvastatin 40 milliGRAM(s) Oral at bedtime  enoxaparin Injectable 40 milliGRAM(s) SubCutaneous every 24 hours  escitalopram 20 milliGRAM(s) Oral daily  folic acid 1 milliGRAM(s) Oral daily  naltrexone 50 milliGRAM(s) Oral daily  pantoprazole    Tablet 40 milliGRAM(s) Oral before breakfast  piperacillin/tazobactam IVPB.. 3.375 Gram(s) IV Intermittent every 8 hours  thiamine 100 milliGRAM(s) Oral daily    MEDICATIONS  (PRN):  acetaminophen     Tablet .. 650 milliGRAM(s) Oral every 6 hours PRN Mild Pain (1 - 3), Moderate Pain (4 - 6)  LORazepam   Injectable 1 milliGRAM(s) IV Push every 1 hour PRN CIWA-Ar score 8 or greater  LORazepam   Injectable 1 milliGRAM(s) IV Push every 2 hours PRN CIWA-Ar score increase by 2 points and a total score of 7 or less        T(F): 98.6 (05-29-23 @ 01:30), Max: 98.6 (05-29-23 @ 01:30)  HR: 74 (05-29-23 @ 01:30) (74 - 88)  BP: 139/65 (05-29-23 @ 01:30) (138/77 - 149/86)  BP(mean): --  RR: 18 (05-29-23 @ 01:30) (17 - 19)  SpO2: 100% (05-29-23 @ 01:30) (97% - 100%)    PHYSICAL EXAM:     CONSTITUTIONAL: No fever, weight loss, or fatigue  RESPIRATORY: No cough, wheezing, chills or hemoptysis; No shortness of breath  CARDIOVASCULAR: +1+ charity LE pitting edema to shins; No chest pain, palpitations, or dizziness  GASTROINTESTINAL: No abdominal or epigastric pain. No nausea, vomiting, or hematemesis; No diarrhea or constipation. No melena or hematochezia.  GENITOURINARY: No dysuria, frequency, hematuria, or incontinence  NEUROLOGICAL: No headaches, memory loss, loss of strength, numbness, or tremors  SKIN: No itching, burning, rashes, or lesions   LYMPH NODES: No enlarged glands  ENDOCRINE: No heat or cold intolerance; No hair loss  MUSCULOSKELETAL: No joint pain or swelling; No muscle, back, or extremity pain  TELEMETRY:    LABS:                        11.6   15.22 )-----------( 167      ( 28 May 2023 05:18 )             35.9     05-28    134<L>  |  97<L>  |  23  ----------------------------<  142<H>  4.0   |  25  |  0.76    Ca    8.5      28 May 2023 05:18  Phos  4.0     05-28  Mg     2.40     05-28    TPro  6.6  /  Alb  3.4  /  TBili  2.2<H>  /  DBili  x   /  AST  82<H>  /  ALT  141<H>  /  AlkPhos  444<H>  05-28        PT/INR - ( 27 May 2023 07:31 )   PT: 15.1 sec;   INR: 1.30 ratio         PTT - ( 27 May 2023 07:31 )  PTT:29.8 sec    Creatinine Trend: 0.76<--, 0.68<--  I&O's Summary    27 May 2023 07:01  -  28 May 2023 07:00  --------------------------------------------------------  IN: 270 mL / OUT: 150 mL / NET: 120 mL      BNP    RADIOLOGY & ADDITIONAL STUDIES:

## 2023-05-29 NOTE — PROGRESS NOTE ADULT - PROBLEM SELECTOR PLAN 7
DVT ppx: lovenox  Mild hyponatremia - Monitor hyponatremia. Check urine ltyes/osmol in the AM if worsens.     Dispo: pending cx results

## 2023-05-29 NOTE — PROGRESS NOTE ADULT - PROBLEM SELECTOR PLAN 1
Pt presenting w/ hyperbilirubinemia, elevated transaminases. CT with CBD dilated to 1.5cm and abrupt narrowing of CBD distally. Soft tissue density. No pancreatic duct dilation. US with no CBD dilation.     - appreciate GI recs  - continue zosyn  - blood cx   - s/p ERCP with stent placement in CB; repeat ERCP in 2-3 mos  - CLD for now, advance as tolerated  - trend CMP, CBC daily   - wean O2 as tolerated Pt presenting w/ hyperbilirubinemia, elevated transaminases. CT with CBD dilated to 1.5cm and abrupt narrowing of CBD distally. Soft tissue density. No pancreatic duct dilation. US with no CBD dilation.     - appreciate GI recs  - continue zosyn (Day 6/7)  - blood cx   - s/p ERCP with stent placement in CB; repeat ERCP in 2-3 mos  - trend CMP, CBC daily   - wean O2 as tolerated Pt presenting w/ hyperbilirubinemia, elevated transaminases. CT with CBD dilated to 1.5cm and abrupt narrowing of CBD distally. Soft tissue density. No pancreatic duct dilation. US with no CBD dilation.     - appreciate GI recs  - continue zosyn (Day 6/7)  - Transition to augmentin or FQ as pt  - blood cx pos for gram neg rods  - s/p ERCP with stent placement in CB; repeat ERCP in 2-3 mos  - trend CMP, CBC daily

## 2023-05-30 ENCOUNTER — TRANSCRIPTION ENCOUNTER (OUTPATIENT)
Age: 83
End: 2023-05-30

## 2023-05-30 VITALS
OXYGEN SATURATION: 100 % | DIASTOLIC BLOOD PRESSURE: 78 MMHG | HEART RATE: 82 BPM | RESPIRATION RATE: 18 BRPM | SYSTOLIC BLOOD PRESSURE: 180 MMHG | TEMPERATURE: 98 F

## 2023-05-30 LAB
ALBUMIN SERPL ELPH-MCNC: 3.1 G/DL — LOW (ref 3.3–5)
ALP SERPL-CCNC: 316 U/L — HIGH (ref 40–120)
ALT FLD-CCNC: 74 U/L — HIGH (ref 4–33)
ANION GAP SERPL CALC-SCNC: 13 MMOL/L — SIGNIFICANT CHANGE UP (ref 7–14)
AST SERPL-CCNC: 28 U/L — SIGNIFICANT CHANGE UP (ref 4–32)
BASOPHILS # BLD AUTO: 0.02 K/UL — SIGNIFICANT CHANGE UP (ref 0–0.2)
BASOPHILS NFR BLD AUTO: 0.2 % — SIGNIFICANT CHANGE UP (ref 0–2)
BILIRUB SERPL-MCNC: 1.2 MG/DL — SIGNIFICANT CHANGE UP (ref 0.2–1.2)
BUN SERPL-MCNC: 16 MG/DL — SIGNIFICANT CHANGE UP (ref 7–23)
CALCIUM SERPL-MCNC: 8.1 MG/DL — LOW (ref 8.4–10.5)
CHLORIDE SERPL-SCNC: 95 MMOL/L — LOW (ref 98–107)
CO2 SERPL-SCNC: 26 MMOL/L — SIGNIFICANT CHANGE UP (ref 22–31)
CREAT SERPL-MCNC: 0.69 MG/DL — SIGNIFICANT CHANGE UP (ref 0.5–1.3)
EGFR: 86 ML/MIN/1.73M2 — SIGNIFICANT CHANGE UP
EOSINOPHIL # BLD AUTO: 0.04 K/UL — SIGNIFICANT CHANGE UP (ref 0–0.5)
EOSINOPHIL NFR BLD AUTO: 0.5 % — SIGNIFICANT CHANGE UP (ref 0–6)
GLUCOSE SERPL-MCNC: 102 MG/DL — HIGH (ref 70–99)
HCT VFR BLD CALC: 32.4 % — LOW (ref 34.5–45)
HGB BLD-MCNC: 10.5 G/DL — LOW (ref 11.5–15.5)
IANC: 6.42 K/UL — SIGNIFICANT CHANGE UP (ref 1.8–7.4)
IMM GRANULOCYTES NFR BLD AUTO: 1.5 % — HIGH (ref 0–0.9)
LYMPHOCYTES # BLD AUTO: 1.07 K/UL — SIGNIFICANT CHANGE UP (ref 1–3.3)
LYMPHOCYTES # BLD AUTO: 13 % — SIGNIFICANT CHANGE UP (ref 13–44)
MAGNESIUM SERPL-MCNC: 2 MG/DL — SIGNIFICANT CHANGE UP (ref 1.6–2.6)
MCHC RBC-ENTMCNC: 29.4 PG — SIGNIFICANT CHANGE UP (ref 27–34)
MCHC RBC-ENTMCNC: 32.4 GM/DL — SIGNIFICANT CHANGE UP (ref 32–36)
MCV RBC AUTO: 90.8 FL — SIGNIFICANT CHANGE UP (ref 80–100)
MONOCYTES # BLD AUTO: 0.56 K/UL — SIGNIFICANT CHANGE UP (ref 0–0.9)
MONOCYTES NFR BLD AUTO: 6.8 % — SIGNIFICANT CHANGE UP (ref 2–14)
NEUTROPHILS # BLD AUTO: 6.42 K/UL — SIGNIFICANT CHANGE UP (ref 1.8–7.4)
NEUTROPHILS NFR BLD AUTO: 78 % — HIGH (ref 43–77)
NRBC # BLD: 0 /100 WBCS — SIGNIFICANT CHANGE UP (ref 0–0)
NRBC # FLD: 0 K/UL — SIGNIFICANT CHANGE UP (ref 0–0)
PHOSPHATE SERPL-MCNC: 3.4 MG/DL — SIGNIFICANT CHANGE UP (ref 2.5–4.5)
PLATELET # BLD AUTO: 201 K/UL — SIGNIFICANT CHANGE UP (ref 150–400)
POTASSIUM SERPL-MCNC: 3.6 MMOL/L — SIGNIFICANT CHANGE UP (ref 3.5–5.3)
POTASSIUM SERPL-SCNC: 3.6 MMOL/L — SIGNIFICANT CHANGE UP (ref 3.5–5.3)
PROT SERPL-MCNC: 6 G/DL — SIGNIFICANT CHANGE UP (ref 6–8.3)
RBC # BLD: 3.57 M/UL — LOW (ref 3.8–5.2)
RBC # FLD: 14.2 % — SIGNIFICANT CHANGE UP (ref 10.3–14.5)
SODIUM SERPL-SCNC: 134 MMOL/L — LOW (ref 135–145)
WBC # BLD: 8.23 K/UL — SIGNIFICANT CHANGE UP (ref 3.8–10.5)
WBC # FLD AUTO: 8.23 K/UL — SIGNIFICANT CHANGE UP (ref 3.8–10.5)

## 2023-05-30 PROCEDURE — 99239 HOSP IP/OBS DSCHRG MGMT >30: CPT | Mod: GC

## 2023-05-30 RX ORDER — METOPROLOL TARTRATE 50 MG
25 TABLET ORAL DAILY
Refills: 0 | Status: DISCONTINUED | OUTPATIENT
Start: 2023-05-30 | End: 2023-05-30

## 2023-05-30 RX ORDER — FOLIC ACID 0.8 MG
1 TABLET ORAL
Qty: 30 | Refills: 0
Start: 2023-05-30 | End: 2023-06-28

## 2023-05-30 RX ORDER — LOSARTAN POTASSIUM 100 MG/1
100 TABLET, FILM COATED ORAL DAILY
Refills: 0 | Status: DISCONTINUED | OUTPATIENT
Start: 2023-05-30 | End: 2023-05-30

## 2023-05-30 RX ADMIN — Medication 1 MILLIGRAM(S): at 10:35

## 2023-05-30 RX ADMIN — PIPERACILLIN AND TAZOBACTAM 25 GRAM(S): 4; .5 INJECTION, POWDER, LYOPHILIZED, FOR SOLUTION INTRAVENOUS at 05:52

## 2023-05-30 RX ADMIN — Medication 1 TABLET(S): at 16:52

## 2023-05-30 RX ADMIN — LOSARTAN POTASSIUM 100 MILLIGRAM(S): 100 TABLET, FILM COATED ORAL at 10:35

## 2023-05-30 RX ADMIN — PANTOPRAZOLE SODIUM 40 MILLIGRAM(S): 20 TABLET, DELAYED RELEASE ORAL at 05:52

## 2023-05-30 RX ADMIN — ENOXAPARIN SODIUM 40 MILLIGRAM(S): 100 INJECTION SUBCUTANEOUS at 10:34

## 2023-05-30 RX ADMIN — Medication 100 MILLIGRAM(S): at 10:36

## 2023-05-30 RX ADMIN — Medication 1 DROP(S): at 10:35

## 2023-05-30 RX ADMIN — ESCITALOPRAM OXALATE 20 MILLIGRAM(S): 10 TABLET, FILM COATED ORAL at 10:35

## 2023-05-30 RX ADMIN — Medication 25 MILLIGRAM(S): at 10:35

## 2023-05-30 NOTE — PROGRESS NOTE ADULT - PROBLEM SELECTOR PLAN 6
Pt takes escitalopram at home.    -c/w home med

## 2023-05-30 NOTE — DISCHARGE NOTE NURSING/CASE MANAGEMENT/SOCIAL WORK - NSDCPEFALRISK_GEN_ALL_CORE
For information on Fall & Injury Prevention, visit: [email or web address] OR  [email or web address] OR  https://www.cdc.gov/steadi/patient.html

## 2023-05-30 NOTE — PROGRESS NOTE ADULT - ASSESSMENT
82y female with PMHx of HTN, etoh use (one bottle of wine daily, no hx withdrawal seizures) presenting from [facility] for concerns of choledocholithiasis, found to have elevated transaminases, hyperbilirubinemia and choledocholithiasis vs soft tissue mass per CT read.

## 2023-05-30 NOTE — PROGRESS NOTE ADULT - PROBLEM SELECTOR PLAN 5
Pt takes pantoprazole at home.    -c/w home PPI

## 2023-05-30 NOTE — PROGRESS NOTE ADULT - SUBJECTIVE AND OBJECTIVE BOX
Internal Medicine   Angella Morocho | PGY-1    OVERNIGHT EVENTS: No acute overnight events.    SUBJECTIVE:       MEDICATIONS  (STANDING):  artificial  tears Solution 1 Drop(s) Both EYES daily  atorvastatin 40 milliGRAM(s) Oral at bedtime  enoxaparin Injectable 40 milliGRAM(s) SubCutaneous every 24 hours  escitalopram 20 milliGRAM(s) Oral daily  folic acid 1 milliGRAM(s) Oral daily  pantoprazole    Tablet 40 milliGRAM(s) Oral before breakfast  piperacillin/tazobactam IVPB.. 3.375 Gram(s) IV Intermittent every 8 hours  thiamine 100 milliGRAM(s) Oral daily    MEDICATIONS  (PRN):  acetaminophen     Tablet .. 650 milliGRAM(s) Oral every 6 hours PRN Mild Pain (1 - 3), Moderate Pain (4 - 6)  benzocaine/menthol Lozenge 1 Lozenge Oral every 2 hours PRN Sore Throat  LORazepam   Injectable 1 milliGRAM(s) IV Push every 1 hour PRN CIWA-Ar score 8 or greater  LORazepam   Injectable 1 milliGRAM(s) IV Push every 2 hours PRN CIWA-Ar score increase by 2 points and a total score of 7 or less        T(F): 98.5 (05-30-23 @ 01:30), Max: 98.7 (05-29-23 @ 21:30)  HR: 80 (05-30-23 @ 01:30) (63 - 80)  BP: 145/70 (05-30-23 @ 01:30) (134/81 - 150/75)  BP(mean): --  RR: 18 (05-30-23 @ 01:30) (18 - 18)  SpO2: 100% (05-30-23 @ 01:30) (100% - 100%)    PHYSICAL EXAM:     GENERAL: NAD, lying in bed comfortably  HEAD:  Atraumatic, Normocephalic  EYES: EOMI, PERRLA, conjunctiva and sclera clear, no nystagmus noted  ENT: Moist mucous membranes,   NECK: Supple, No JVD, trachea midline  CHEST/LUNG: Clear to auscultation bilaterally; No rales, rhonchi, wheezing, or rubs. Unlabored respirations  HEART: Regular rate and rhythm; No murmurs, rubs, or gallops, normal S1/S2  ABDOMEN: normal bowel sounds; Soft, nontender, nondistended, no organomegaly   EXTREMITIES:  2+ Peripheral Pulses, brisk capillary refill. No clubbing, cyanosis, or edema  MSK: No gross deformities noted   Neurological:  A&Ox3, no focal deficits   SKIN: No rashes or lesions  PSYCH: Normal mood, affect     TELEMETRY:    LABS:                        10.5   8.23  )-----------( 201      ( 30 May 2023 05:30 )             32.4     05-29    133<L>  |  98  |  22  ----------------------------<  117<H>  3.7   |  26  |  0.78    Ca    8.4      29 May 2023 06:40  Phos  2.9     05-29  Mg     2.30     05-29    TPro  6.4  /  Alb  3.4  /  TBili  1.3<H>  /  DBili  x   /  AST  39<H>  /  ALT  99<H>  /  AlkPhos  371<H>  05-29            Creatinine Trend: 0.78<--, 0.76<--, 0.68<--  I&O's Summary    BNP    RADIOLOGY & ADDITIONAL STUDIES:             Internal Medicine   Angella Bailee | PGY-1    OVERNIGHT EVENTS: No acute overnight events.    SUBJECTIVE:       MEDICATIONS  (STANDING):  artificial  tears Solution 1 Drop(s) Both EYES daily  atorvastatin 40 milliGRAM(s) Oral at bedtime  enoxaparin Injectable 40 milliGRAM(s) SubCutaneous every 24 hours  escitalopram 20 milliGRAM(s) Oral daily  folic acid 1 milliGRAM(s) Oral daily  pantoprazole    Tablet 40 milliGRAM(s) Oral before breakfast  piperacillin/tazobactam IVPB.. 3.375 Gram(s) IV Intermittent every 8 hours  thiamine 100 milliGRAM(s) Oral daily    MEDICATIONS  (PRN):  acetaminophen     Tablet .. 650 milliGRAM(s) Oral every 6 hours PRN Mild Pain (1 - 3), Moderate Pain (4 - 6)  benzocaine/menthol Lozenge 1 Lozenge Oral every 2 hours PRN Sore Throat  LORazepam   Injectable 1 milliGRAM(s) IV Push every 1 hour PRN CIWA-Ar score 8 or greater  LORazepam   Injectable 1 milliGRAM(s) IV Push every 2 hours PRN CIWA-Ar score increase by 2 points and a total score of 7 or less        T(F): 98.5 (05-30-23 @ 01:30), Max: 98.7 (05-29-23 @ 21:30)  HR: 80 (05-30-23 @ 01:30) (63 - 80)  BP: 145/70 (05-30-23 @ 01:30) (134/81 - 150/75)  BP(mean): --  RR: 18 (05-30-23 @ 01:30) (18 - 18)  SpO2: 100% (05-30-23 @ 01:30) (100% - 100%)    PHYSICAL EXAM:     CONSTITUTIONAL: No fever, weight loss, or fatigue  RESPIRATORY: No cough, wheezing, chills or hemoptysis; No shortness of breath  CARDIOVASCULAR: +1+ charity LE pitting edema to shins; No chest pain, palpitations, or dizziness  GASTROINTESTINAL: No abdominal or epigastric pain. No nausea, vomiting, or hematemesis; No diarrhea or constipation. No melena or hematochezia.  GENITOURINARY: No dysuria, frequency, hematuria, or incontinence  NEUROLOGICAL: No headaches, memory loss, loss of strength, numbness, or tremors  SKIN: No itching, burning, rashes, or lesions   LYMPH NODES: No enlarged glands  ENDOCRINE: No heat or cold intolerance; No hair loss  MUSCULOSKELETAL: No joint pain or swelling; No muscle, back, or extremity pain    TELEMETRY:    LABS:                        10.5   8.23  )-----------( 201      ( 30 May 2023 05:30 )             32.4     05-29    133<L>  |  98  |  22  ----------------------------<  117<H>  3.7   |  26  |  0.78    Ca    8.4      29 May 2023 06:40  Phos  2.9     05-29  Mg     2.30     05-29    TPro  6.4  /  Alb  3.4  /  TBili  1.3<H>  /  DBili  x   /  AST  39<H>  /  ALT  99<H>  /  AlkPhos  371<H>  05-29            Creatinine Trend: 0.78<--, 0.76<--, 0.68<--  I&O's Summary    BNP    RADIOLOGY & ADDITIONAL STUDIES:             Internal Medicine   Angella Bailee | PGY-1    OVERNIGHT EVENTS: No acute overnight events.    SUBJECTIVE: Patient was seen and examined at bedside this morning. Denies any nausea/vomiting/diarrhea, headache, shortness of breath, abdominal pain or chest pain/palpitations. Pt denies any further episodes of visual hallucinations after natrexone was discontinued. Patient responding appropriately to questions and able to make needs known. Vital signs/imaging/telemetry events reviewed.       MEDICATIONS  (STANDING):  artificial  tears Solution 1 Drop(s) Both EYES daily  atorvastatin 40 milliGRAM(s) Oral at bedtime  enoxaparin Injectable 40 milliGRAM(s) SubCutaneous every 24 hours  escitalopram 20 milliGRAM(s) Oral daily  folic acid 1 milliGRAM(s) Oral daily  pantoprazole    Tablet 40 milliGRAM(s) Oral before breakfast  piperacillin/tazobactam IVPB.. 3.375 Gram(s) IV Intermittent every 8 hours  thiamine 100 milliGRAM(s) Oral daily    MEDICATIONS  (PRN):  acetaminophen     Tablet .. 650 milliGRAM(s) Oral every 6 hours PRN Mild Pain (1 - 3), Moderate Pain (4 - 6)  benzocaine/menthol Lozenge 1 Lozenge Oral every 2 hours PRN Sore Throat  LORazepam   Injectable 1 milliGRAM(s) IV Push every 1 hour PRN CIWA-Ar score 8 or greater  LORazepam   Injectable 1 milliGRAM(s) IV Push every 2 hours PRN CIWA-Ar score increase by 2 points and a total score of 7 or less        T(F): 98.5 (05-30-23 @ 01:30), Max: 98.7 (05-29-23 @ 21:30)  HR: 80 (05-30-23 @ 01:30) (63 - 80)  BP: 145/70 (05-30-23 @ 01:30) (134/81 - 150/75)  BP(mean): --  RR: 18 (05-30-23 @ 01:30) (18 - 18)  SpO2: 100% (05-30-23 @ 01:30) (100% - 100%)    PHYSICAL EXAM:     CONSTITUTIONAL: No fever, weight loss, or fatigue  RESPIRATORY: No cough, wheezing, chills or hemoptysis; No shortness of breath  CARDIOVASCULAR: +1+ charity LE pitting edema to shins; No chest pain, palpitations, or dizziness  GASTROINTESTINAL: No abdominal or epigastric pain. No nausea, vomiting, or hematemesis; No diarrhea or constipation. No melena or hematochezia.  GENITOURINARY: No dysuria, frequency, hematuria, or incontinence  NEUROLOGICAL: No headaches, memory loss, loss of strength, numbness, or tremors  SKIN: No itching, burning, rashes, or lesions   LYMPH NODES: No enlarged glands  ENDOCRINE: No heat or cold intolerance; No hair loss  MUSCULOSKELETAL: No joint pain or swelling; No muscle, back, or extremity pain    TELEMETRY:    LABS:                        10.5   8.23  )-----------( 201      ( 30 May 2023 05:30 )             32.4     05-29    133<L>  |  98  |  22  ----------------------------<  117<H>  3.7   |  26  |  0.78    Ca    8.4      29 May 2023 06:40  Phos  2.9     05-29  Mg     2.30     05-29    TPro  6.4  /  Alb  3.4  /  TBili  1.3<H>  /  DBili  x   /  AST  39<H>  /  ALT  99<H>  /  AlkPhos  371<H>  05-29            Creatinine Trend: 0.78<--, 0.76<--, 0.68<--  I&O's Summary    BNP    RADIOLOGY & ADDITIONAL STUDIES:

## 2023-05-30 NOTE — PROGRESS NOTE ADULT - PROBLEM SELECTOR PLAN 2
Pt w/ hx of almost daily alcohol use (bottle of wine). Last drink on Thursday night (5/18). Still within window of 24-72 hours. No prior hx of withdrawal (including seizures).    -sx triggered CIWA  -c/w home thiamine and folic acid  - fall & seizure precautions    - zofran 4mg IV Q8H PRN nausea and/or vomiting  - monitor electrolytes & replete PRN  -  consult  - Discontinued naltrexone iso pt having ep of hallucinations

## 2023-05-30 NOTE — DISCHARGE NOTE NURSING/CASE MANAGEMENT/SOCIAL WORK - NSDCFUADDAPPT_GEN_ALL_CORE_FT
1. Please schedule a follow up appointment with your primary care doctor within 2 weeks of being discharged from the hospital.   2. Please schedule a follow up appointment with [facility] to confirm appointment/for your outpatient follow up in due in 2-3 months; [phone] ([facility] at [address] [place] [address])

## 2023-05-30 NOTE — PROGRESS NOTE ADULT - PROBLEM SELECTOR PLAN 3
Pt takes amlodipine, metoprolol, and losartan at home.    -hold iso procedure Pt takes amlodipine 5mg QD, metoprolol 25mg QD, and losartan 100mg QD at home.  - Restart losartan and metoprolol 5/30 iso HTN

## 2023-05-30 NOTE — PROGRESS NOTE ADULT - PROBLEM SELECTOR PLAN 7
DVT ppx: lovenox  Mild hyponatremia - Monitor hyponatremia. Check urine ltyes/osmol in the AM if worsens.     Dispo: pending cx results DVT ppx: lovenox  Mild hyponatremia - Monitor hyponatremia. Check urine ltyes/osmol in the AM if worsens.   Diet: Regular  Dispo: pending cx results

## 2023-05-30 NOTE — PHARMACOTHERAPY INTERVENTION NOTE - COMMENTS
Discharge medications reviewed with the patient. Current medication schedule was discussed in detail including: medication name, indication, dose, administration times, treatment duration, side effects, and special instructions. Questions and concerns were answered and addressed. Patient demonstrated understanding. Patient provided with educational handout.    New medications: Sorin Moe, PharmD, [facility]  Clinical Pharmacy Specialist  [id number]

## 2023-05-30 NOTE — PROGRESS NOTE ADULT - PROBLEM SELECTOR PLAN 4
Pt takes rosuvastatin at home.    -c/w statin

## 2023-05-30 NOTE — PROGRESS NOTE ADULT - REASON FOR ADMISSION
concern for choledocholithiasis

## 2023-05-30 NOTE — PROGRESS NOTE ADULT - PROBLEM SELECTOR PLAN 1
Pt presenting w/ hyperbilirubinemia, elevated transaminases. CT with CBD dilated to 1.5cm and abrupt narrowing of CBD distally. Soft tissue density. No pancreatic duct dilation. US with no CBD dilation.     - appreciate GI recs  - continue zosyn (Day 7/7)  - Transition to augmentin or FQ as outpt  - blood cx pos for gram neg rods  - s/p ERCP with stent placement in CB; repeat ERCP in 2-3 mos  - trend CMP, CBC daily  - pending cx sensitivities Pt presenting w/ hyperbilirubinemia, elevated transaminases. CT with CBD dilated to 1.5cm and abrupt narrowing of CBD distally. Soft tissue density. No pancreatic duct dilation. US with no CBD dilation.     - appreciate GI recs  - continue zosyn while pending cx sensitivities  - Transition to augmentin or FQ as outpt  - blood cx pos for gram neg rods  - s/p ERCP with stent placement in CB; repeat ERCP in 2-3 mos  - trend CMP, CBC daily

## 2023-05-30 NOTE — DISCHARGE NOTE NURSING/CASE MANAGEMENT/SOCIAL WORK - PATIENT PORTAL LINK FT
You can access the FollowMyHealth Patient Portal offered by [facility] by registering at the following website: [email or web address]. By joining [email or web address] FollowMyHealth portal, you will also be able to view your health information using other applications (apps) compatible with our system.

## 2023-05-30 NOTE — PROGRESS NOTE ADULT - ATTENDING COMMENTS
s/p ERCP yesterday  Downtrending LAEs  Feeling better    c/w abx  advance diet as tolerated  plan for EGD in 3 months for stent removal  No barriers to discharge tomm if continuing to do well and continued downtrending labs
83 yo F PMH of HTN, HLD, obesity, etoh use presenting with 1 week of abd pain and jaundice. Patient was admitted to OSH 1 week ago, but due to improving LFTs, was discharged. Imaging done at OSH showed dilated duct and cholelithiasis on ultrasound. Pt now returns to the ED after abdominal pain progressively worsened. Seen by GI and now s/p ERCP on 5/27 and notable for dilated CBD with stones and pus flowing from the bile duct. S/p bile duct was cannulation w/ sphincterotomy and stent     Of note Pt also underwent CTA in the ED, without sig findings of PE or pulmonary pathology. Just some mild bibasilar subsegmental atelectasis. Pt has been sating well on RA w/o respiratory symptoms     -Will monitor pt on symptom triggered CIWA given daily alcohol use. No hx of withdrawal seizures. Continue thiamine and folic acid. CIWAs have been 0. Will likely dc soon if se remains stable   - C/w zosyn for now. Per GI upon discharge can  transition to augmentin or Fluoroquinolones as outpatient given gram negative positive blood cultures at OSH. With plan for outpt f/u in 2-3 months for repeat ERCP    Dc planning. PT rec home PT. Will f/u SW/CM.
Patient seen and examined at bedside. in brief, 83 yo F PMH of HTN, HLD, obesity, etoh use presenting with 1 week of abd pain and jaundice. Patient was admitted to OSH 1 week ago, but due to improving LFTs, was discharged. Imaging done at OSH showed dilated duct and cholelithiasis on ultrasound. Pt now returns to the ED after abdominal pain progressively worsened.  Admission blood cx on 5/27 also + Ecoli Seen by GI and now s/p ERCP on 5/27 and notable for dilated CBD with stones and pus flowing from the bile duct. S/p bile duct was cannulation w/ sphincterotomy and stent     - Patient mental status now back at baseline. Medically stable for discharge   -C/w zosyn for now. Per GI upon discharge can  transition to augmentin  as outpatient given gram negative positive blood cultures at OSH. With plan for outpt f/u in 2-3 months for repeat ERCP. F/u blood  sensitivities. - pan sensitive   #HTn   - re-starting home BP meds. Pt reports having blood pressure cuff at home.   35 minutes spent discharge planning. s. PT rec home PT. Will f/u SW/CM.
81 yo F PMH of HTN, HLD, obesity, etoh use presenting with 1 week of abd pain and jaundice. Patient was admitted to OSH 1 week ago, but due to improving LFTs, was discharged. Imaging done at OSH showed dilated duct and cholelithiasis on ultrasound. Pt now returns to the ED after abdominal pain progressively worsened.  Admission blood cx on 5/27 also + Ecoli Seen by GI and now s/p ERCP on 5/27 and notable for dilated CBD with stones and pus flowing from the bile duct. S/p bile duct was cannulation w/ sphincterotomy and stent     Of note Pt also underwent CTA in the ED, without sig findings of PE or pulmonary pathology. Just some mild bibasilar subsegmental atelectasis. Pt has been sating well on RA w/o respiratory symptoms     -Overnight events of hallucinations noted. Unclear etiology. Pt did start naltrexone yesterday which could potentially be contributing. If symptoms recur would dc naltrexone/   -Will monitor pt on symptom triggered CIWA given daily alcohol use. No hx of withdrawal seizures. Continue thiamine and folic acid. CIWAs have been 0-2. Will continue w/ CIWA for now given pt reported having recent  hallucinations.   -C/w zosyn for now. Per GI upon discharge can  transition to augmentin or Fluoroquinolones as outpatient given gram negative positive blood cultures at OSH. With plan for outpt f/u in 2-3 months for repeat ERCP. F/u blood  sensitivities.     Dc planning pending blood cx sensitivities and if pt remains stable w/o further hallucinations. PT rec home PT. Will f/u SW/CM.

## 2023-06-01 LAB
CULTURE RESULTS: SIGNIFICANT CHANGE UP
SPECIMEN SOURCE: SIGNIFICANT CHANGE UP

## 2023-06-03 LAB
CULTURE RESULTS: SIGNIFICANT CHANGE UP
CULTURE RESULTS: SIGNIFICANT CHANGE UP
SPECIMEN SOURCE: SIGNIFICANT CHANGE UP
SPECIMEN SOURCE: SIGNIFICANT CHANGE UP

## 2023-06-08 ENCOUNTER — NON-APPOINTMENT (OUTPATIENT)
Age: 83
End: 2023-06-08

## 2023-06-08 DIAGNOSIS — K83.09 OTHER CHOLANGITIS: ICD-10-CM

## 2023-08-17 ENCOUNTER — OUTPATIENT (OUTPATIENT)
Dept: OUTPATIENT SERVICES | Facility: HOSPITAL | Age: 83
LOS: 1 days | End: 2023-08-17

## 2023-08-17 VITALS
HEART RATE: 83 BPM | TEMPERATURE: 98 F | DIASTOLIC BLOOD PRESSURE: 80 MMHG | HEIGHT: 65.5 IN | WEIGHT: 233.91 LBS | OXYGEN SATURATION: 97 % | RESPIRATION RATE: 18 BRPM | SYSTOLIC BLOOD PRESSURE: 140 MMHG

## 2023-08-17 DIAGNOSIS — I10 ESSENTIAL (PRIMARY) HYPERTENSION: ICD-10-CM

## 2023-08-17 DIAGNOSIS — Z96.651 PRESENCE OF RIGHT ARTIFICIAL KNEE JOINT: Chronic | ICD-10-CM

## 2023-08-17 DIAGNOSIS — Z96.652 PRESENCE OF LEFT ARTIFICIAL KNEE JOINT: Chronic | ICD-10-CM

## 2023-08-17 DIAGNOSIS — E78.00 PURE HYPERCHOLESTEROLEMIA, UNSPECIFIED: ICD-10-CM

## 2023-08-17 DIAGNOSIS — Z98.890 OTHER SPECIFIED POSTPROCEDURAL STATES: Chronic | ICD-10-CM

## 2023-08-17 DIAGNOSIS — K83.09 OTHER CHOLANGITIS: ICD-10-CM

## 2023-08-17 DIAGNOSIS — F41.9 ANXIETY DISORDER, UNSPECIFIED: ICD-10-CM

## 2023-08-17 DIAGNOSIS — Z91.89 OTHER SPECIFIED PERSONAL RISK FACTORS, NOT ELSEWHERE CLASSIFIED: ICD-10-CM

## 2023-08-17 DIAGNOSIS — Z90.49 ACQUIRED ABSENCE OF OTHER SPECIFIED PARTS OF DIGESTIVE TRACT: Chronic | ICD-10-CM

## 2023-08-17 DIAGNOSIS — Z98.49 CATARACT EXTRACTION STATUS, UNSPECIFIED EYE: Chronic | ICD-10-CM

## 2023-08-17 PROBLEM — Z00.00 ENCOUNTER FOR PREVENTIVE HEALTH EXAMINATION: Status: ACTIVE | Noted: 2023-08-17

## 2023-08-17 LAB
ALBUMIN SERPL ELPH-MCNC: 4.5 G/DL — SIGNIFICANT CHANGE UP (ref 3.3–5)
ALP SERPL-CCNC: 105 U/L — SIGNIFICANT CHANGE UP (ref 40–120)
ALT FLD-CCNC: 12 U/L — SIGNIFICANT CHANGE UP (ref 4–33)
ANION GAP SERPL CALC-SCNC: 14 MMOL/L — SIGNIFICANT CHANGE UP (ref 7–14)
AST SERPL-CCNC: 16 U/L — SIGNIFICANT CHANGE UP (ref 4–32)
BILIRUB SERPL-MCNC: 0.3 MG/DL — SIGNIFICANT CHANGE UP (ref 0.2–1.2)
BUN SERPL-MCNC: 16 MG/DL — SIGNIFICANT CHANGE UP (ref 7–23)
CALCIUM SERPL-MCNC: 8.9 MG/DL — SIGNIFICANT CHANGE UP (ref 8.4–10.5)
CHLORIDE SERPL-SCNC: 101 MMOL/L — SIGNIFICANT CHANGE UP (ref 98–107)
CO2 SERPL-SCNC: 25 MMOL/L — SIGNIFICANT CHANGE UP (ref 22–31)
CREAT SERPL-MCNC: 0.76 MG/DL — SIGNIFICANT CHANGE UP (ref 0.5–1.3)
EGFR: 77 ML/MIN/1.73M2 — SIGNIFICANT CHANGE UP
GLUCOSE SERPL-MCNC: 99 MG/DL — SIGNIFICANT CHANGE UP (ref 70–99)
HCT VFR BLD CALC: 38.6 % — SIGNIFICANT CHANGE UP (ref 34.5–45)
HGB BLD-MCNC: 12.6 G/DL — SIGNIFICANT CHANGE UP (ref 11.5–15.5)
MCHC RBC-ENTMCNC: 29.6 PG — SIGNIFICANT CHANGE UP (ref 27–34)
MCHC RBC-ENTMCNC: 32.6 GM/DL — SIGNIFICANT CHANGE UP (ref 32–36)
MCV RBC AUTO: 90.6 FL — SIGNIFICANT CHANGE UP (ref 80–100)
NRBC # BLD: 0 /100 WBCS — SIGNIFICANT CHANGE UP (ref 0–0)
NRBC # FLD: 0 K/UL — SIGNIFICANT CHANGE UP (ref 0–0)
PLATELET # BLD AUTO: 180 K/UL — SIGNIFICANT CHANGE UP (ref 150–400)
POTASSIUM SERPL-MCNC: 4.1 MMOL/L — SIGNIFICANT CHANGE UP (ref 3.5–5.3)
POTASSIUM SERPL-SCNC: 4.1 MMOL/L — SIGNIFICANT CHANGE UP (ref 3.5–5.3)
PROT SERPL-MCNC: 7.3 G/DL — SIGNIFICANT CHANGE UP (ref 6–8.3)
RBC # BLD: 4.26 M/UL — SIGNIFICANT CHANGE UP (ref 3.8–5.2)
RBC # FLD: 13.7 % — SIGNIFICANT CHANGE UP (ref 10.3–14.5)
SODIUM SERPL-SCNC: 140 MMOL/L — SIGNIFICANT CHANGE UP (ref 135–145)
WBC # BLD: 6.11 K/UL — SIGNIFICANT CHANGE UP (ref 3.8–10.5)
WBC # FLD AUTO: 6.11 K/UL — SIGNIFICANT CHANGE UP (ref 3.8–10.5)

## 2023-08-17 RX ORDER — SODIUM CHLORIDE 9 MG/ML
1000 INJECTION, SOLUTION INTRAVENOUS
Refills: 0 | Status: DISCONTINUED | OUTPATIENT
Start: 2023-08-29 | End: 2023-09-12

## 2023-08-17 NOTE — H&P PST ADULT - PROBLEM SELECTOR PLAN 1
Patient tentatively scheduled for  ERCP Stent removal on 8/29/23.  Pre-op instructions provided. Pt given verbal and written instructions. Pt verbalized understanding.    CBC, CMP were done at PST.  Copy of EKG in chart.  Copy of Echocardiogram requested.  Cardiology evaluation requested (Advanced age, c/o ROSADO) Patient tentatively scheduled for  ERCP Stent removal on 8/29/23.  Pre-op instructions provided. Pt given verbal and written instructions. Pt verbalized understanding.    CBC, CMP were done at PST.  Copy of EKG in chart.  Copy of Echocardiogram requested.  Medical evaluation for Cardiology referral requested (Advanced age, c/o ROSADO). Patient tentatively scheduled for  ERCP Stent removal on 8/29/23.  Pre-op instructions provided. Pt given verbal and written instructions. Pt verbalized understanding.    CBC, CMP were done at PST.  Copy of EKG in chart.  Copy of Echocardiogram requested.  Medical evaluation for Cardiology referral requested (Advanced age, c/o ROSADO). Patient has an appointment Aug 21.

## 2023-08-17 NOTE — H&P PST ADULT - NSICDXPASTMEDICALHX_GEN_ALL_CORE_FT
PAST MEDICAL HISTORY:  Alcohol use disorder     Anxiety and depression     Benign breast lumps     GERD (gastroesophageal reflux disease)     Hyperlipidemia     Hypertension     Other cholangitis      PAST MEDICAL HISTORY:  Alcohol use disorder     Anxiety and depression     Benign breast lumps     GERD (gastroesophageal reflux disease)     Hyperlipidemia     Hypertension     Morbidly obese     Other cholangitis

## 2023-08-17 NOTE — H&P PST ADULT - FUNCTIONAL STATUS
METS 3- ROSADO while climbing up stairs, walking up hill and doing house chores without symptoms./4-10 METS METS 3- ROSADO while climbing up stairs, walking up hill and doing house chores without symptoms./less than 4 METS

## 2023-08-17 NOTE — H&P PST ADULT - NSICDXPASTSURGICALHX_GEN_ALL_CORE_FT
PAST SURGICAL HISTORY:  History of cataract surgery     History of lumpectomy     History of right knee joint replacement     Knee joint replacement status, left     S/P appendectomy     S/P bunionectomy

## 2023-08-17 NOTE — H&P PST ADULT - BSA (M2)
Date of Service: 05/23/2019    HISTORY OF PRESENT ILLNESS:  The patient mentions that she broke out in a rash on her leg.  The patient apparently received Shingrix shot on 04/03/2019 and developed rash 3 weeks after injection.  The rash has been burning and itching.  The patient is also wondering if she could take a second shingles shot or if these are shingles.  The patient does not feel that the rash is spreading.  Denies any systemic signs and symptoms of rash.  Denies any new foods.  Denies any chemical or laundry detergents, exposure to wood, poison ivy or sumac plants.  The patient has tried over-the-counter Cortizone-10 cream and initially felt that the rash was getting better.  The patient denies any chemical exposure or recent medication except the shingles shot.  Patient denies any history of bug bite, insect bite or tick bite.  The patient has been in the same environment for several years.  The patient, of course, is retired.    ASSESSMENT AND PLAN:  I am not sure.  I discussed that if this was shingles she has missed the window period of being treated with Valtrex.  My personal recommendation would be to treat it with a stronger steroid cream and see if that helps.  If it continues, then would recommend to get it biopsied.  The patient verbalizes understanding and agrees with the plan.      Dictated By: Zully Tolliver MD  Signing Provider: MD JYOTSNA Bynum/ALLEN ([id number])  DD: 06/03/2019 07:18:58 TD: 06/04/2019 06:29:33    Copy Sent To:    2.13

## 2023-08-17 NOTE — H&P PST ADULT - OPH GEN HX ROS MEA POS PC
wears glasses or distance and reading/blurred vision L/blurred vision R wears glasses for distance and reading/blurred vision L/blurred vision R

## 2023-08-17 NOTE — H&P PST ADULT - HISTORY OF PRESENT ILLNESS
83 year old female with pmhx of HTN, HLD, GERD, Anxiety and depression presents for preop evaluation for diagnosis of Other cholangitis. Pt is scheduled for ERCP Stent removal. Pt was admitted in hospital [facility] in May 2023 for abdominal pain, found to have gallstones and stent was placed.

## 2023-08-21 ENCOUNTER — APPOINTMENT (OUTPATIENT)
Dept: CARDIOLOGY | Facility: CLINIC | Age: 83
End: 2023-08-21
Payer: MEDICARE

## 2023-08-21 VITALS
HEIGHT: 65 IN | DIASTOLIC BLOOD PRESSURE: 84 MMHG | WEIGHT: 235 LBS | OXYGEN SATURATION: 95 % | BODY MASS INDEX: 39.15 KG/M2 | SYSTOLIC BLOOD PRESSURE: 132 MMHG | HEART RATE: 89 BPM | RESPIRATION RATE: 14 BRPM

## 2023-08-21 VITALS — SYSTOLIC BLOOD PRESSURE: 128 MMHG | DIASTOLIC BLOOD PRESSURE: 80 MMHG

## 2023-08-21 DIAGNOSIS — R94.31 ABNORMAL ELECTROCARDIOGRAM [ECG] [EKG]: ICD-10-CM

## 2023-08-21 DIAGNOSIS — Z78.9 OTHER SPECIFIED HEALTH STATUS: ICD-10-CM

## 2023-08-21 DIAGNOSIS — E78.00 PURE HYPERCHOLESTEROLEMIA, UNSPECIFIED: ICD-10-CM

## 2023-08-21 DIAGNOSIS — K80.71 CALCULUS OF GALLBLADDER AND BILE DUCT W/OUT CHOLECYSTITIS WITH OBSTRUCTION: ICD-10-CM

## 2023-08-21 DIAGNOSIS — R06.02 SHORTNESS OF BREATH: ICD-10-CM

## 2023-08-21 DIAGNOSIS — I10 ESSENTIAL (PRIMARY) HYPERTENSION: ICD-10-CM

## 2023-08-21 PROCEDURE — 99204 OFFICE O/P NEW MOD 45 MIN: CPT

## 2023-08-21 RX ORDER — METOPROLOL SUCCINATE 25 MG/1
25 TABLET, EXTENDED RELEASE ORAL
Refills: 0 | Status: ACTIVE | COMMUNITY

## 2023-08-21 RX ORDER — AMLODIPINE BESYLATE 5 MG/1
5 TABLET ORAL
Refills: 0 | Status: ACTIVE | COMMUNITY

## 2023-08-21 RX ORDER — ROSUVASTATIN CALCIUM 10 MG/1
10 TABLET, FILM COATED ORAL
Refills: 0 | Status: ACTIVE | COMMUNITY

## 2023-08-21 RX ORDER — FOLIC ACID 1 MG/1
1 TABLET ORAL
Refills: 0 | Status: ACTIVE | COMMUNITY

## 2023-08-21 RX ORDER — ELECTROLYTES/DEXTROSE
100 SOLUTION, ORAL ORAL
Refills: 0 | Status: ACTIVE | COMMUNITY

## 2023-08-21 RX ORDER — YOHIMBE BARK 500 MG
CAPSULE ORAL
Refills: 0 | Status: ACTIVE | COMMUNITY

## 2023-08-21 RX ORDER — PANTOPRAZOLE SODIUM 40 MG/1
40 TABLET, DELAYED RELEASE ORAL
Refills: 0 | Status: ACTIVE | COMMUNITY

## 2023-08-21 RX ORDER — LOSARTAN POTASSIUM 100 MG/1
100 TABLET, FILM COATED ORAL
Refills: 0 | Status: ACTIVE | COMMUNITY

## 2023-08-21 RX ORDER — VIT A/VIT C/VIT E/ZINC/COPPER 4296-226
CAPSULE ORAL
Refills: 0 | Status: ACTIVE | COMMUNITY

## 2023-08-22 ENCOUNTER — APPOINTMENT (OUTPATIENT)
Dept: CARDIOLOGY | Facility: CLINIC | Age: 83
End: 2023-08-22
Payer: MEDICARE

## 2023-08-22 PROCEDURE — 93306 TTE W/DOPPLER COMPLETE: CPT

## 2023-08-24 ENCOUNTER — APPOINTMENT (OUTPATIENT)
Dept: CARDIOLOGY | Facility: CLINIC | Age: 83
End: 2023-08-24
Payer: MEDICARE

## 2023-08-24 PROCEDURE — 93018 CV STRESS TEST I&R ONLY: CPT

## 2023-08-25 ENCOUNTER — APPOINTMENT (OUTPATIENT)
Dept: CARDIOLOGY | Facility: CLINIC | Age: 83
End: 2023-08-25

## 2023-08-29 ENCOUNTER — TRANSCRIPTION ENCOUNTER (OUTPATIENT)
Age: 83
End: 2023-08-29

## 2023-08-29 ENCOUNTER — APPOINTMENT (OUTPATIENT)
Dept: GASTROENTEROLOGY | Facility: HOSPITAL | Age: 83
End: 2023-08-29

## 2023-08-29 ENCOUNTER — OUTPATIENT (OUTPATIENT)
Dept: OUTPATIENT SERVICES | Facility: HOSPITAL | Age: 83
LOS: 1 days | Discharge: HOME | End: 2023-08-29
Payer: MEDICARE

## 2023-08-29 VITALS
HEART RATE: 80 BPM | OXYGEN SATURATION: 95 % | HEIGHT: 65 IN | TEMPERATURE: 97 F | DIASTOLIC BLOOD PRESSURE: 85 MMHG | RESPIRATION RATE: 20 BRPM | WEIGHT: 233.91 LBS | SYSTOLIC BLOOD PRESSURE: 179 MMHG

## 2023-08-29 VITALS
OXYGEN SATURATION: 99 % | DIASTOLIC BLOOD PRESSURE: 65 MMHG | RESPIRATION RATE: 17 BRPM | SYSTOLIC BLOOD PRESSURE: 160 MMHG | HEART RATE: 81 BPM

## 2023-08-29 DIAGNOSIS — Z98.890 OTHER SPECIFIED POSTPROCEDURAL STATES: Chronic | ICD-10-CM

## 2023-08-29 DIAGNOSIS — Z96.651 PRESENCE OF RIGHT ARTIFICIAL KNEE JOINT: Chronic | ICD-10-CM

## 2023-08-29 DIAGNOSIS — Z90.49 ACQUIRED ABSENCE OF OTHER SPECIFIED PARTS OF DIGESTIVE TRACT: Chronic | ICD-10-CM

## 2023-08-29 DIAGNOSIS — Z96.652 PRESENCE OF LEFT ARTIFICIAL KNEE JOINT: Chronic | ICD-10-CM

## 2023-08-29 DIAGNOSIS — Z98.49 CATARACT EXTRACTION STATUS, UNSPECIFIED EYE: Chronic | ICD-10-CM

## 2023-08-29 DIAGNOSIS — K83.09 OTHER CHOLANGITIS: ICD-10-CM

## 2023-08-29 PROCEDURE — 43262 ENDO CHOLANGIOPANCREATOGRAPH: CPT | Mod: GC

## 2023-08-29 PROCEDURE — 43264 ERCP REMOVE DUCT CALCULI: CPT | Mod: GC

## 2023-08-29 PROCEDURE — 43275 ERCP REMOVE FORGN BODY DUCT: CPT | Mod: GC

## 2023-08-29 PROCEDURE — 74330 X-RAY BILE/PANC ENDOSCOPY: CPT | Mod: 26,GC

## 2023-08-29 DEVICE — GWIRE JAGTOME REVOLUTION RX 260CM/0.025IN: Type: IMPLANTABLE DEVICE | Status: FUNCTIONAL

## 2023-08-29 NOTE — ASU PATIENT PROFILE, ADULT - NSICDXPASTMEDICALHX_GEN_ALL_CORE_FT
PAST MEDICAL HISTORY:  Alcohol use disorder     Anxiety and depression     Benign breast lumps     GERD (gastroesophageal reflux disease)     Hyperlipidemia     Hypertension     Morbidly obese     Other cholangitis

## 2023-08-29 NOTE — PRE PROCEDURE NOTE - PRE PROCEDURE EVALUATION
Attending Physician:   Dr. Garcia    Procedure: ERCP     Indication for Procedure: Stent removal and stone extraction    ________________________________________________________  PAST MEDICAL & SURGICAL HISTORY:  Hypertension      Alcohol use disorder      Hyperlipidemia      Benign breast lumps      Anxiety and depression      GERD (gastroesophageal reflux disease)      Other cholangitis      Morbidly obese      S/P bunionectomy      History of right knee joint replacement      Knee joint replacement status, left      S/P appendectomy      History of lumpectomy      History of cataract surgery        ALLERGIES:  No Known Allergies    HOME MEDICATIONS:  escitalopram 20 mg oral tablet: 1 orally once a day  losartan 100 mg oral tablet: 1 orally once a day  metoprolol succinate 25 mg oral tablet, extended release: 1 orally once a day  pantoprazole 40 mg oral delayed release tablet: 1 orally once a day  PreserVision AREDS 2 oral capsule: 1 cap(s) orally once a day LD 8/21/23  PreserVision AREDS oral capsule: 1 capsule orally 2 times a day LD 8/21/23  rosuvastatin 10 mg oral tablet: 1 orally once a day (at bedtime)  Vitamin B12 1 tab daily:   Vitamin D3 1 tab daily:     AICD/PPM: [ ] yes   [x ] no    PERTINENT LAB DATA:                      PHYSICAL EXAMINATION:  VSS    Constitutional: NAD  HEENT: PERRLA, EOMI,    Neck:  No JVD  Respiratory: CTAB/L  Cardiovascular: S1 and S2  Gastrointestinal: BS+, soft, NT/ND  Extremities: No peripheral edema  Neurological: A/O x 3, no focal deficits  Psychiatric: Normal mood, normal affect  Skin: No rashes    ASA Class: I [ ]  II [ ]  III [ x]  IV [ ]    COMMENTS:    The patient is a suitable candidate for the planned procedure unless box checked [ ]  No, explain:    I explained the risks (bleeding, infection, perforation, pancreatitis, CV risk, anesthesia risk)/b/a with the patient. The patient agrees to proceed.

## 2023-08-29 NOTE — ASU PREOP CHECKLIST - BSA (M2)
2.11 Benzoyl Peroxide Counseling: Patient counseled that medicine may cause skin irritation and bleach clothing.  In the event of skin irritation, the patient was advised to reduce the amount of the drug applied or use it less frequently.   The patient verbalized understanding of the proper use and possible adverse effects of benzoyl peroxide.  All of the patient's questions and concerns were addressed.

## 2023-09-12 RX ORDER — THIAMINE MONONITRATE (VIT B1) 100 MG
1 TABLET ORAL
Refills: 0 | DISCHARGE

## 2023-09-12 RX ORDER — MULTIVIT-MIN/FERROUS GLUCONATE 9 MG/15 ML
1 LIQUID (ML) ORAL
Refills: 0 | DISCHARGE

## 2023-09-12 RX ORDER — METOPROLOL TARTRATE 50 MG
1 TABLET ORAL
Refills: 0 | DISCHARGE

## 2023-09-12 RX ORDER — AMLODIPINE BESYLATE 2.5 MG/1
1 TABLET ORAL
Refills: 0 | DISCHARGE

## 2023-09-12 RX ORDER — LOSARTAN POTASSIUM 100 MG/1
1 TABLET, FILM COATED ORAL
Refills: 0 | DISCHARGE

## 2023-09-12 RX ORDER — PANTOPRAZOLE SODIUM 20 MG/1
1 TABLET, DELAYED RELEASE ORAL
Refills: 0 | DISCHARGE

## 2023-09-12 RX ORDER — ROSUVASTATIN CALCIUM 5 MG/1
1 TABLET ORAL
Refills: 0 | DISCHARGE

## 2023-09-12 RX ORDER — ESCITALOPRAM OXALATE 10 MG/1
1 TABLET, FILM COATED ORAL
Refills: 0 | DISCHARGE

## 2023-09-21 ENCOUNTER — OFFICE (OUTPATIENT)
Dept: URBAN - METROPOLITAN AREA CLINIC 97 | Facility: CLINIC | Age: 83
Setting detail: OPHTHALMOLOGY
End: 2023-09-21
Payer: COMMERCIAL

## 2023-09-21 DIAGNOSIS — H26.493: ICD-10-CM

## 2023-09-21 DIAGNOSIS — H43.811: ICD-10-CM

## 2023-09-21 DIAGNOSIS — H35.3131: ICD-10-CM

## 2023-09-21 DIAGNOSIS — Z96.1: ICD-10-CM

## 2023-09-21 DIAGNOSIS — H52.4: ICD-10-CM

## 2023-09-21 DIAGNOSIS — H16.223: ICD-10-CM

## 2023-09-21 PROCEDURE — 92134 CPTRZ OPH DX IMG PST SGM RTA: CPT

## 2023-09-21 PROCEDURE — 92014 COMPRE OPH EXAM EST PT 1/>: CPT

## 2023-09-21 PROCEDURE — 92015 DETERMINE REFRACTIVE STATE: CPT

## 2023-09-21 ASSESSMENT — VISUAL ACUITY
OD_BCVA: 20/30
OS_BCVA: 20/40

## 2023-09-21 ASSESSMENT — SPHEQUIV_DERIVED
OS_SPHEQUIV: -0.25
OD_SPHEQUIV: -0.25
OD_SPHEQUIV: -0.125

## 2023-09-21 ASSESSMENT — REFRACTION_CURRENTRX
OD_ADD: +2.50
OS_ADD: +2.75
OD_OVR_VA: 20/
OS_SPHERE: +0.25
OD_ADD: +2.75
OS_VPRISM_DIRECTION: PROGS
OD_VPRISM_DIRECTION: PROGS
OD_SPHERE: -0.75
OS_OVR_VA: 20/
OS_CYLINDER: SPHERE
OS_VPRISM_DIRECTION: PROGS
OD_SPHERE: -0.50
OD_AXIS: 006
OS_OVR_VA: 20/
OD_OVR_VA: 20/
OD_CYLINDER: +1.00
OD_VPRISM_DIRECTION: PROGS
OS_ADD: +2.50
OS_CYLINDER: SPH
OD_CYLINDER: SPHERE
OS_SPHERE: -0.75

## 2023-09-21 ASSESSMENT — REFRACTION_MANIFEST
OD_SPHERE: -0.75
OS_ADD: +3.00
OS_CYLINDER: +0.25
OS_VA2: 20/25(J1)
OU_VA: 20/25
OS_VA1: 20/20
OD_VA1: 20/25-2
OD_ADD: +3.00
OS_AXIS: 075
OD_VA2: 20/25(J1)
OD_AXIS: 030
OD_CYLINDER: +1.00
OS_SPHERE: PLANO

## 2023-09-21 ASSESSMENT — AXIALLENGTH_DERIVED
OD_AL: 23.2775
OS_AL: 23.506
OD_AL: 23.325

## 2023-09-21 ASSESSMENT — CONFRONTATIONAL VISUAL FIELD TEST (CVF)
OD_FINDINGS: FULL
OS_FINDINGS: FULL

## 2023-09-21 ASSESSMENT — KERATOMETRY
OS_K2POWER_DIOPTERS: 44.25
OD_K1POWER_DIOPTERS: 44.25
OD_K2POWER_DIOPTERS: 44.75
OS_K1POWER_DIOPTERS: 43.75
OD_AXISANGLE_DEGREES: 050
METHOD_AUTO_MANUAL: AUTO
OS_AXISANGLE_DEGREES: 107

## 2023-09-21 ASSESSMENT — REFRACTION_AUTOREFRACTION
OD_CYLINDER: +0.75
OS_SPHERE: -0.50
OS_CYLINDER: +0.50
OD_SPHERE: -0.50
OS_AXIS: 073
OD_AXIS: 030

## 2023-09-21 ASSESSMENT — PUNCTA - ASSESSMENT
OD_PUNCTA: LARGE
OS_PUNCTA: LARGE

## 2024-01-22 ENCOUNTER — OFFICE (OUTPATIENT)
Dept: URBAN - METROPOLITAN AREA CLINIC 97 | Facility: CLINIC | Age: 84
Setting detail: OPHTHALMOLOGY
End: 2024-01-22
Payer: MEDICARE

## 2024-01-22 DIAGNOSIS — H02.131: ICD-10-CM

## 2024-01-22 DIAGNOSIS — H02.134: ICD-10-CM

## 2024-01-22 DIAGNOSIS — H16.223: ICD-10-CM

## 2024-01-22 PROCEDURE — 99213 OFFICE O/P EST LOW 20 MIN: CPT | Performed by: OPHTHALMOLOGY

## 2024-01-22 ASSESSMENT — SPHEQUIV_DERIVED
OD_SPHEQUIV: -0.125
OD_SPHEQUIV: -0.25
OS_SPHEQUIV: 0

## 2024-01-22 ASSESSMENT — PUNCTA - ASSESSMENT
OS_PUNCTA: LARGE
OD_PUNCTA: LARGE

## 2024-01-22 ASSESSMENT — REFRACTION_MANIFEST
OD_ADD: +3.00
OD_AXIS: 030
OD_SPHERE: -0.75
OU_VA: 20/25
OS_SPHERE: PLANO
OS_CYLINDER: +0.25
OS_VA2: 20/25(J1)
OS_ADD: +3.00
OD_VA2: 20/25(J1)
OS_VA1: 20/20
OD_CYLINDER: +1.00
OS_AXIS: 075
OD_VA1: 20/25-2

## 2024-01-22 ASSESSMENT — REFRACTION_CURRENTRX
OS_SPHERE: +0.25
OS_OVR_VA: 20/
OD_ADD: +2.50
OS_ADD: +2.75
OS_CYLINDER: SPH
OD_OVR_VA: 20/
OD_SPHERE: -0.75
OS_VPRISM_DIRECTION: PROGS
OD_OVR_VA: 20/
OD_ADD: +2.75
OS_CYLINDER: SPHERE
OS_VPRISM_DIRECTION: PROGS
OD_CYLINDER: SPHERE
OS_OVR_VA: 20/
OD_VPRISM_DIRECTION: PROGS
OD_CYLINDER: +1.00
OD_AXIS: 006
OS_ADD: +2.50
OS_SPHERE: -0.75
OD_VPRISM_DIRECTION: PROGS
OD_SPHERE: -0.50

## 2024-01-22 ASSESSMENT — REFRACTION_AUTOREFRACTION
OD_CYLINDER: +0.25
OD_SPHERE: -0.25
OS_CYLINDER: +0.50
OD_AXIS: 103
OS_SPHERE: -0.25
OS_AXIS: 064

## 2024-01-22 ASSESSMENT — LID POSITION - ECTROPION
OS_ECTROPION: LUL 1+
OD_ECTROPION: RUL 1+

## 2024-01-22 ASSESSMENT — CONFRONTATIONAL VISUAL FIELD TEST (CVF)
OS_FINDINGS: FULL
OD_FINDINGS: FULL

## 2024-02-26 ENCOUNTER — OFFICE (OUTPATIENT)
Dept: URBAN - METROPOLITAN AREA CLINIC 97 | Facility: CLINIC | Age: 84
Setting detail: OPHTHALMOLOGY
End: 2024-02-26
Payer: COMMERCIAL

## 2024-02-26 DIAGNOSIS — H02.131: ICD-10-CM

## 2024-02-26 DIAGNOSIS — H02.134: ICD-10-CM

## 2024-02-26 DIAGNOSIS — H16.223: ICD-10-CM

## 2024-02-26 PROCEDURE — 99213 OFFICE O/P EST LOW 20 MIN: CPT | Performed by: OPHTHALMOLOGY

## 2024-02-26 ASSESSMENT — REFRACTION_CURRENTRX
OS_SPHERE: +0.25
OS_CYLINDER: SPH
OD_ADD: +2.50
OS_SPHERE: -0.75
OD_OVR_VA: 20/
OS_ADD: +2.50
OD_CYLINDER: SPHERE
OS_CYLINDER: SPHERE
OD_OVR_VA: 20/
OD_AXIS: 006
OD_CYLINDER: +1.00
OS_VPRISM_DIRECTION: PROGS
OS_OVR_VA: 20/
OS_OVR_VA: 20/
OD_SPHERE: -0.50
OD_ADD: +2.75
OS_VPRISM_DIRECTION: PROGS
OS_ADD: +2.75
OD_VPRISM_DIRECTION: PROGS
OD_VPRISM_DIRECTION: PROGS
OD_SPHERE: -0.75

## 2024-02-26 ASSESSMENT — REFRACTION_MANIFEST
OS_ADD: +3.00
OD_VA2: 20/25(J1)
OS_SPHERE: PLANO
OD_AXIS: 030
OS_CYLINDER: +0.25
OS_VA1: 20/20
OD_VA1: 20/25-2
OD_CYLINDER: +1.00
OD_ADD: +3.00
OD_SPHERE: -0.75
OU_VA: 20/25
OS_VA2: 20/25(J1)
OS_AXIS: 075

## 2024-02-26 ASSESSMENT — PUNCTA - ASSESSMENT
OD_PUNCTA: LARGE
OS_PUNCTA: LARGE

## 2024-02-26 ASSESSMENT — REFRACTION_AUTOREFRACTION
OD_SPHERE: -0.25
OD_CYLINDER: +0.25
OS_CYLINDER: +0.50
OS_SPHERE: -0.25
OD_AXIS: 103
OS_AXIS: 064

## 2024-02-26 ASSESSMENT — SPHEQUIV_DERIVED
OS_SPHEQUIV: 0
OD_SPHEQUIV: -0.125
OD_SPHEQUIV: -0.25

## 2024-02-26 ASSESSMENT — LID POSITION - ECTROPION
OD_ECTROPION: RUL 1+
OS_ECTROPION: LUL 1+

## 2024-02-26 ASSESSMENT — CONFRONTATIONAL VISUAL FIELD TEST (CVF)
OS_FINDINGS: FULL
OD_FINDINGS: FULL

## 2024-02-27 PROBLEM — I10 ESSENTIAL (PRIMARY) HYPERTENSION: Chronic | Status: ACTIVE | Noted: 2023-05-27

## 2024-02-27 PROBLEM — F41.9 ANXIETY DISORDER, UNSPECIFIED: Chronic | Status: ACTIVE | Noted: 2023-08-17

## 2024-02-27 PROBLEM — K83.09 OTHER CHOLANGITIS: Chronic | Status: ACTIVE | Noted: 2023-08-17

## 2024-02-27 PROBLEM — K21.9 GASTRO-ESOPHAGEAL REFLUX DISEASE WITHOUT ESOPHAGITIS: Chronic | Status: ACTIVE | Noted: 2023-08-17

## 2024-02-27 PROBLEM — E66.01 MORBID (SEVERE) OBESITY DUE TO EXCESS CALORIES: Chronic | Status: ACTIVE | Noted: 2023-08-17

## 2024-02-27 PROBLEM — N63.0 UNSPECIFIED LUMP IN UNSPECIFIED BREAST: Chronic | Status: ACTIVE | Noted: 2023-08-17

## 2024-02-27 PROBLEM — F10.90 ALCOHOL USE, UNSPECIFIED, UNCOMPLICATED: Chronic | Status: ACTIVE | Noted: 2023-05-27

## 2024-02-27 PROBLEM — E78.5 HYPERLIPIDEMIA, UNSPECIFIED: Chronic | Status: ACTIVE | Noted: 2023-08-17

## 2024-03-26 ENCOUNTER — APPOINTMENT (OUTPATIENT)
Dept: OPHTHALMOLOGY | Facility: CLINIC | Age: 84
End: 2024-03-26

## 2024-10-17 ENCOUNTER — NON-APPOINTMENT (OUTPATIENT)
Age: 84
End: 2024-10-17

## 2024-10-17 ENCOUNTER — APPOINTMENT (OUTPATIENT)
Dept: OPHTHALMOLOGY | Facility: CLINIC | Age: 84
End: 2024-10-17
Payer: MEDICARE

## 2024-10-17 PROCEDURE — 92134 CPTRZ OPH DX IMG PST SGM RTA: CPT

## 2024-10-17 PROCEDURE — 99204 OFFICE O/P NEW MOD 45 MIN: CPT

## 2024-10-25 ENCOUNTER — APPOINTMENT (OUTPATIENT)
Dept: OPHTHALMOLOGY | Facility: CLINIC | Age: 84
End: 2024-10-25
Payer: MEDICARE

## 2024-10-25 PROCEDURE — 92083 EXTENDED VISUAL FIELD XM: CPT

## 2024-10-28 ENCOUNTER — APPOINTMENT (OUTPATIENT)
Dept: OPHTHALMOLOGY | Facility: CLINIC | Age: 84
End: 2024-10-28
Payer: MEDICARE

## 2024-10-28 ENCOUNTER — NON-APPOINTMENT (OUTPATIENT)
Age: 84
End: 2024-10-28

## 2024-10-28 PROCEDURE — 66821 AFTER CATARACT LASER SURGERY: CPT | Mod: RT

## 2024-10-30 ENCOUNTER — APPOINTMENT (OUTPATIENT)
Dept: NEUROLOGY | Facility: CLINIC | Age: 84
End: 2024-10-30

## 2024-12-04 ENCOUNTER — APPOINTMENT (OUTPATIENT)
Dept: OPHTHALMOLOGY | Facility: CLINIC | Age: 84
End: 2024-12-04
Payer: MEDICARE

## 2024-12-04 ENCOUNTER — NON-APPOINTMENT (OUTPATIENT)
Age: 84
End: 2024-12-04

## 2024-12-04 PROCEDURE — 99024 POSTOP FOLLOW-UP VISIT: CPT

## 2025-02-19 ENCOUNTER — APPOINTMENT (OUTPATIENT)
Dept: OPHTHALMOLOGY | Facility: CLINIC | Age: 85
End: 2025-02-19

## 2025-03-03 NOTE — H&P ADULT - HISTORY OF PRESENT ILLNESS
82y female with hx HTN, etoh use (one bottle of wine daily, no hx withdrawal seizures) presents from [facility] for concerns of choledocholithiasis.     Patient accompanied by grandson at bedside. Per grandson, patient is at baseline mental status, but appears a little disoriented at times given that they have not slept all night and have been transferred from another hospital. Patient had abdominal pain 1 week prior and had gone to the hospital for evaluation. At [facility], her transaminases had been improving so she was discharged with followup with GI for ?EUS. Patient reports that she was doing well but had recurrent abd pain in Thursday with emesis (nonbloody). Per grandson, patient had been jaundiced one day prior to admission. Abdominal pain more frequently after meals.     CT with CBD dilated to 1.5cm and abrupt narrowing of CBD distally. Soft tissue density. No pancreatic duct dilation. US with no CBD dilation.     Patient reports mild abd pain with palpation only (bilateral upper quadrants). Has not had an endoscopy previously. Patient denies fevers, chills, dysuria.     Patient drinks a large bottle of alcohol a day. Has not had withdrawal symptoms previously/szs/DTs previously. Has been at detox 3-4 years ago.     In ED, LR mIVF started, toradol x1, zosyn x2.  Flomax every night before bed.  Take all of the antibiotic.  Pain medication as needed.  Follow up with Dr. Wang as previously scheduled.  Be sure you are drinking plenty of water.  Return to ER for any new, worsening, or change in condition.      82y female with hx HTN, etoh use (one bottle of wine daily, no hx withdrawal seizures) presents from [facility] for concerns of choledocholithiasis. Pt stated that since last Thursday, she has been having abdominal pain located in upper quadrants. It worsens with coughing. Denied nausea or vomiting currently.    Per grandson, patient is at baseline mental status, but appears a little disoriented at times given that they have not slept all night and have been transferred from another hospital. Patient had abdominal pain 1 week prior and had gone to the hospital for evaluation. At [facility], her transaminases had been improving so she was discharged with followup with GI for ?EUS. Patient reports that she was doing well but had recurrent abd pain in Thursday with emesis (nonbloody). Per grandson, patient had been jaundiced one day prior to admission. Abdominal pain more frequently after meals.     CT with CBD dilated to 1.5cm and abrupt narrowing of CBD distally. Soft tissue density. No pancreatic duct dilation. US with no CBD dilation.     Patient reports mild abd pain with palpation only (bilateral upper quadrants). Has not had an endoscopy previously. Patient denies fevers, chills, dysuria.     Patient drinks a large bottle of alcohol a day. Has not had withdrawal symptoms previously/szs/DTs previously. Has been at detox 3-4 years ago. Last drink was on Thursday night.     In ED, LR mIVF started, toradol x1, zosyn x2.  82y female with hx HTN, etoh use (one bottle of wine daily, no hx withdrawal seizures) presents from [facility] for concerns of choledocholithiasis. Pt stated that since last Thursday, she has been having abdominal pain located in upper quadrants. It worsens with coughing. Denied fever, chills, nausea or vomiting currently.    Per grandson, patient is at baseline mental status, but appears a little disoriented at times given that they have not slept all night and have been transferred from another hospital. Patient had abdominal pain 1 week prior and had gone to the hospital for evaluation. At [facility], her transaminases had been improving so she was discharged with followup with GI for ?EUS. Patient reports that she was doing well but had recurrent abd pain in Thursday with emesis (nonbloody). Per grandson, patient had been jaundiced one day prior to admission. Abdominal pain more frequently after meals.     Patient drinks a large bottle of alcohol a day. Has not had withdrawal symptoms previously/szs/DTs previously. Has been at detox 3-4 years ago. Last drink was on Thursday night.     At [facility]: CT with CBD dilated to 1.5cm and abrupt narrowing of CBD distally. Soft tissue density. No pancreatic duct dilation. US with no CBD dilation.     In ED, LR mIVF started, toradol x1, zosyn x2.

## 2025-03-21 ENCOUNTER — NON-APPOINTMENT (OUTPATIENT)
Age: 85
End: 2025-03-21

## 2025-03-24 NOTE — DISCHARGE NOTE PROVIDER - EXTENDED VTE YES NO FOR MLM ENOXAPARIN
[facility]  Post Procedure Discharge Instructions        YOU HAVE HAD THE FOLLOWING PROCEDURE:                                  [] Occipital Nerve Blocks  [] CTS wrist injection(s)  [] Knee Injection(s)         [] Shoulder Injection(s)   [] Elbow Injection(s)     [x] Botox Injection  [] Cervical Trigger Point Injections    [] Thoracic Trigger Point Injections    [] Lumbar Trigger Point Injections  [] Piriformis Trigger Point Injections  [] SI Joint Injection(s)     [] Trochanteric Bursa Injection(s)       [] Ankle Injection(s)   [] Plantar Fasciitis   []  ______________  Injection(s) [x] Botox [x]  Migraines [] Spasticity    YOU HAVE RECEIVED THE FOLLOWING MEDICATIONS IN YOUR INJECTION(s)  [] Lidocaine [] Bupivacaine   [] DepoMedrol (steroid) [] Decadron (steroid)  []  Kenalog (steroid)   [] Toradol  [] Supartz [] Zilretta    [x] Botox        PATIENT INFORMATION:   You may experience the following symptoms after your procedure. These symptoms are normal and should not cause concern:    You may have an increase in your pain. This may last 24 - 48 hours after your procedure.  You may have no change in the pain that you had prior to your injection(s).  You may have weakness or numbness in your affected extremity. If this occurs, this may last until numbing the medication wears off.     REPORT THE FOLLOWING SYMPTOMS TO YOUR DOCTOR:  Redness, swelling or drainage at the injection site(s)  Unusual pain that interferes with your normal activities of daily living.    OTHER INSTRUCTIONS:    [] I will apply ice to the injection site(s) for at least 24 hours after the procedure. I will rotate the ice on for 20 minutes and off for 20 minutes for at least 24 hours.    [] I will not apply heat for at least 48 hours and I will not take a hot bath or shower for at least 24 hours.     [] I understand that if Lidocaine or Bupivacaine was used in my injection(s) that the injection site(s) 
,

## 2025-06-05 ENCOUNTER — NON-APPOINTMENT (OUTPATIENT)
Age: 85
End: 2025-06-05

## 2025-06-05 ENCOUNTER — APPOINTMENT (OUTPATIENT)
Dept: OPHTHALMOLOGY | Facility: CLINIC | Age: 85
End: 2025-06-05
Payer: MEDICARE

## 2025-06-05 PROCEDURE — 92014 COMPRE OPH EXAM EST PT 1/>: CPT

## 2025-06-05 PROCEDURE — 92134 CPTRZ OPH DX IMG PST SGM RTA: CPT

## 2025-06-05 NOTE — DISCHARGE NOTE PROVIDER - ATTENDING ATTESTATION STATEMENT
Margy Godoy MD sent to [facility]  Please inform Mounjaro not approved---Victoza injection is covered so I sent the new rx in to pharmacy--             I have personally seen and examined the patient. I have collaborated with and supervised the

## 2025-06-30 ENCOUNTER — NON-APPOINTMENT (OUTPATIENT)
Age: 85
End: 2025-06-30

## (undated) DEVICE — SYR LUER LOK 3CC

## (undated) DEVICE — NDL HYPO SAFE 22G X 1" (BLACK)

## (undated) DEVICE — OLYMPUS DISTAL COVER ENDOSCOPE

## (undated) DEVICE — DRSG CURITY GAUZE SPONGE 4 X 4" 12-PLY NON-STERILE

## (undated) DEVICE — TUBING MEDI-VAC W MAXIGRIP CONNECTORS 1/4"X6'

## (undated) DEVICE — BASIN EMESIS 10IN GRADUATED MAUVE

## (undated) DEVICE — VENODYNE/SCD SLEEVE CALF MEDIUM

## (undated) DEVICE — NDL BOSTON SCIENTIFIC RX TRIPLE LUMEN NDL KNIFE XL 5.5F

## (undated) DEVICE — GOWN LG

## (undated) DEVICE — OMNIPAQUE 300  30ML

## (undated) DEVICE — LABELS BLANK W PEN

## (undated) DEVICE — BITE BLOCK ADULT 20 X 27MM (GREEN)

## (undated) DEVICE — SALIVA EJECTOR (BLUE)

## (undated) DEVICE — SOL IRR POUR NS 0.9% 500ML

## (undated) DEVICE — INJ SYS RAP REFIL

## (undated) DEVICE — WARMING BLANKET FULL ADULT

## (undated) DEVICE — DRSG BANDAID 0.75X3"

## (undated) DEVICE — DRSG 2X2

## (undated) DEVICE — ORGANIZER MIO MEDICAL DEVICE DISP

## (undated) DEVICE — DVC AUTO CAP RX LOKG

## (undated) DEVICE — SNARE DISP

## (undated) DEVICE — SYR LUER LOK 10CC

## (undated) DEVICE — PACK IV START WITH CHG

## (undated) DEVICE — SENSOR O2 FINGER ADULT

## (undated) DEVICE — TUBING IV SET GRAVITY 1Y 78" MACRO

## (undated) DEVICE — LINE BREATHE SAMPLNG

## (undated) DEVICE — DENTURE CUP PINK

## (undated) DEVICE — CATH IV SAFE BC 22G X 1" (BLUE)

## (undated) DEVICE — UNDERPAD LINEN SAVER 17 X 24"

## (undated) DEVICE — ELCTR ECG CONDUCTIVE ADHESIVE

## (undated) DEVICE — TUBING SUCTION NONCONDUCTIVE 6MM X 12FT

## (undated) DEVICE — ANESTHESIA CIRCUIT ADULT HMEF

## (undated) DEVICE — CONTAINER FORMALIN 10% 20ML

## (undated) DEVICE — SOL INJ NS 0.9% 500ML 1-PORT